# Patient Record
Sex: FEMALE | Race: WHITE | Employment: OTHER | URBAN - METROPOLITAN AREA
[De-identification: names, ages, dates, MRNs, and addresses within clinical notes are randomized per-mention and may not be internally consistent; named-entity substitution may affect disease eponyms.]

---

## 2020-10-09 ENCOUNTER — TELEPHONE (OUTPATIENT)
Dept: INTERNAL MEDICINE CLINIC | Age: 30
End: 2020-10-09

## 2020-10-09 NOTE — TELEPHONE ENCOUNTER
----- Message from Marlen Melendez sent at 10/9/2020  9:43 AM EDT -----  Regarding: Dr. Rosanna Fraire Message/Vendor Calls    Caller's first and last name: Milady Francoise      Reason for call:Np would like to have an appt to discuss Anxiety and Depression      Callback required yes/no and why: yes      Best contact number(s): [phone]      Details to clarify the request:       Marlen Melendez

## 2020-10-13 NOTE — TELEPHONE ENCOUNTER
Incoming call from a friend of the patient named Polly requesting a new patient with [facility]. Polly expressed she is concerned because the patient is having some mental health issues. Shonna Dumont is not taking anymore new patients for 2020 but will be happy to schedule with a provider who is taking new patients. Patient has been scheduled to see Justice Stevens on Oct [place]NJ at 8:30. Keyona Child will notify the patient of the appt details.

## 2020-10-21 NOTE — PROGRESS NOTES
Assessment and Plan   Diagnoses and all orders for this visit:    1. Anxiety  -     TSH 3RD GENERATION; Future  -     T4, FREE; Future  -     busPIRone (BUSPAR) 5 mg tablet; Take 1 Tab by mouth three (3) times daily. Indications: repeated episodes of anxiety  Has had anxiety and depression intermittently since teenage years and has previously been on medications including antidepressants, Klonopin, and lorazepam.  Has had a \"rough\" year with multiple medical issues with her animals that has caused some friction in her marriage. Feels that her anxiety symptoms are now affecting her daily life. DELANO-7 score of 18. She is hesitant to start SSRIs given side effects in the past.  Will do a trial of buspirone. Advised to call if she has any issues before next visit. 2. Insomnia, unspecified type  -     traZODone (DESYREL) 50 mg tablet; Take 1 Tab by mouth nightly as needed for Sleep. Has been using over-the-counter medications for sleep but that has not been helping. We will do a trial of trazodone    3. Routine adult health maintenance  -     HEMOGLOBIN A1C WITH EAG; Future  -     METABOLIC PANEL, COMPREHENSIVE; Future  -     LIPID PANEL; Future        Benefits, risks, possible drug interactions, and side effects of all new medications were reviewed with the patient. Pt verbalized understanding. Return to clinic: 4 to 6 weeks for medication follow-up    Adilene Maynard MD  [facility]  10/22/2020    Future Appointments   Date Time Provider Blaise Boyce   21/18/6207  3:00 AM Asiya Limon MD [facility]        History of Present Illness   Chief Complaint   Establish kody Sahni is a 31 y.o. female         Review of Systems   Constitutional: Negative for chills and fever. HENT: Negative for hearing loss. Eyes: Negative for blurred vision. Respiratory: Negative for shortness of breath. Cardiovascular: Negative for chest pain. Gastrointestinal: Negative for abdominal pain, blood in stool, constipation, diarrhea, melena, nausea and vomiting. Genitourinary: Negative for dysuria and hematuria. Musculoskeletal: Negative for joint pain. Skin: Negative for rash. Neurological: Negative for headaches. Past Medical History     Allergies   Allergen Reactions    Penicillins Anaphylaxis        Current Outpatient Medications   Medication Sig    levonorgestreL (Mirena) 20 mcg/24 hours (5 yrs) 52 mg IUD 1 Device by IntraUTERine route once.  traZODone (DESYREL) 50 mg tablet Take 1 Tab by mouth nightly as needed for Sleep.  busPIRone (BUSPAR) 5 mg tablet Take 1 Tab by mouth three (3) times daily. Indications: repeated episodes of anxiety     No current facility-administered medications for this visit. Patient Active Problem List   Diagnosis Code    Anxiety F41.9    Insomnia, unspecified type G47.00     Past Surgical History:   Procedure Laterality Date    HX APPENDECTOMY        Social History     Tobacco Use    Smoking status: Never Smoker    Smokeless tobacco: Current User    Tobacco comment: occl vape   Substance Use Topics    Alcohol use: Yes     Comment: ~2drinks/week      Family History   Problem Relation Age of Onset    No Known Problems Mother     No Known Problems Father     Heart Disease Maternal Grandfather     Stroke Maternal Grandmother         Physical Exam   Vitals:       Visit Vitals  /86   Pulse 67   Temp 98.4 °F (36.9 °C) (Oral)   Resp 16   Ht 5' 4\" (1.626 m)   Wt 163 lb 3.2 oz (74 kg)   SpO2 97%   BMI 28.01 kg/m²        Physical Exam  Constitutional:       General: She is not in acute distress. Appearance: She is well-developed. HENT:      Right Ear: Tympanic membrane, ear canal and external ear normal.      Left Ear: Tympanic membrane, ear canal and external ear normal.   Eyes:      Extraocular Movements: Extraocular movements intact.       Conjunctiva/sclera: Conjunctivae normal. Neck:      Musculoskeletal: Neck supple. Cardiovascular:      Rate and Rhythm: Normal rate and regular rhythm. Pulses: Normal pulses. Heart sounds: No murmur. No friction rub. No gallop. Pulmonary:      Effort: No respiratory distress. Breath sounds: No wheezing, rhonchi or rales. Abdominal:      General: Bowel sounds are normal. There is no distension. Palpations: Abdomen is soft. There is no hepatomegaly, splenomegaly or mass. Tenderness: There is no abdominal tenderness. There is no guarding. Skin:     General: Skin is warm. Findings: No rash. Neurological:      Mental Status: She is alert. Gait: Gait normal.   Psychiatric:         Mood and Affect: Mood normal.         Thought Content:  Thought content normal.         Judgment: Judgment normal.

## 2020-10-22 ENCOUNTER — OFFICE VISIT (OUTPATIENT)
Dept: INTERNAL MEDICINE CLINIC | Age: 30
End: 2020-10-22
Payer: COMMERCIAL

## 2020-10-22 VITALS
SYSTOLIC BLOOD PRESSURE: 136 MMHG | HEIGHT: 64 IN | HEART RATE: 67 BPM | TEMPERATURE: 98.4 F | OXYGEN SATURATION: 97 % | DIASTOLIC BLOOD PRESSURE: 86 MMHG | BODY MASS INDEX: 27.86 KG/M2 | RESPIRATION RATE: 16 BRPM | WEIGHT: 163.2 LBS

## 2020-10-22 DIAGNOSIS — F41.9 ANXIETY: Primary | ICD-10-CM

## 2020-10-22 DIAGNOSIS — G47.00 INSOMNIA, UNSPECIFIED TYPE: ICD-10-CM

## 2020-10-22 DIAGNOSIS — Z00.00 ROUTINE ADULT HEALTH MAINTENANCE: ICD-10-CM

## 2020-10-22 LAB
ALBUMIN SERPL-MCNC: 4 G/DL (ref 3.5–5)
ALBUMIN/GLOB SERPL: 1.5 {RATIO} (ref 1.1–2.2)
ALP SERPL-CCNC: 66 U/L (ref 45–117)
ALT SERPL-CCNC: 27 U/L (ref 12–78)
ANION GAP SERPL CALC-SCNC: 7 MMOL/L (ref 5–15)
AST SERPL-CCNC: 14 U/L (ref 15–37)
BILIRUB SERPL-MCNC: 0.8 MG/DL (ref 0.2–1)
BUN SERPL-MCNC: 13 MG/DL (ref 6–20)
BUN/CREAT SERPL: 15 (ref 12–20)
CALCIUM SERPL-MCNC: 9.4 MG/DL (ref 8.5–10.1)
CHLORIDE SERPL-SCNC: 107 MMOL/L (ref 97–108)
CHOLEST SERPL-MCNC: 167 MG/DL
CO2 SERPL-SCNC: 26 MMOL/L (ref 21–32)
CREAT SERPL-MCNC: 0.86 MG/DL (ref 0.55–1.02)
EST. AVERAGE GLUCOSE BLD GHB EST-MCNC: 94 MG/DL
GLOBULIN SER CALC-MCNC: 2.7 G/DL (ref 2–4)
GLUCOSE SERPL-MCNC: 84 MG/DL (ref 65–100)
HBA1C MFR BLD: 4.9 % (ref 4–5.6)
HDLC SERPL-MCNC: 59 MG/DL
HDLC SERPL: 2.8 {RATIO} (ref 0–5)
LDLC SERPL CALC-MCNC: 86.8 MG/DL (ref 0–100)
LIPID PROFILE,FLP: NORMAL
POTASSIUM SERPL-SCNC: 4.2 MMOL/L (ref 3.5–5.1)
PROT SERPL-MCNC: 6.7 G/DL (ref 6.4–8.2)
SODIUM SERPL-SCNC: 140 MMOL/L (ref 136–145)
T4 FREE SERPL-MCNC: 1 NG/DL (ref 0.8–1.5)
TRIGL SERPL-MCNC: 106 MG/DL (ref ?–150)
TSH SERPL DL<=0.05 MIU/L-ACNC: 0.92 UIU/ML (ref 0.36–3.74)
VLDLC SERPL CALC-MCNC: 21.2 MG/DL

## 2020-10-22 PROCEDURE — 99204 OFFICE O/P NEW MOD 45 MIN: CPT | Performed by: INTERNAL MEDICINE

## 2020-10-22 RX ORDER — TRAZODONE HYDROCHLORIDE 50 MG/1
50 TABLET ORAL
Qty: 30 TAB | Refills: 1 | Status: SHIPPED | OUTPATIENT
Start: 2020-10-22

## 2020-10-22 RX ORDER — LEVONORGESTREL 52 MG/1
1 INTRAUTERINE DEVICE INTRAUTERINE ONCE
COMMUNITY

## 2020-10-22 RX ORDER — BUSPIRONE HYDROCHLORIDE 5 MG/1
5 TABLET ORAL 3 TIMES DAILY
Qty: 90 TAB | Refills: 1 | Status: SHIPPED | OUTPATIENT
Start: 2020-10-22

## 2020-11-18 NOTE — PROGRESS NOTES
Consent: Meena Carrera, who was seen by synchronous (real-time) audio-video technology, and/or her healthcare decision maker, is aware that this patient-initiated, Telehealth encounter on 11/19/2020 is a billable service, with coverage as determined by her insurance carrier. She is aware that she may receive a bill and has provided verbal consent to proceed: Yes. I was in the office while conducting this encounter. This visit was done with doxy. me    Assessment and Plan   Diagnoses and all orders for this visit:    1. Anxiety  -     LORazepam (ATIVAN) 0.5 mg tablet; Take 1 tab by mouth once daily as needed up to twice a week for anxiety  2. Insomnia, unspecified type  -     suvorexant (BELSOMRA) 10 mg tablet; Take 1 Tab by mouth nightly as needed for Insomnia. Max Daily Amount: 10 mg. Indications: difficulty sleeping  From last visit:  \"Has had anxiety and depression intermittently since teenage years and has previously been on medications including antidepressants, Klonopin, and lorazepam.  Has had a \"rough\" year with multiple medical issues with her animals that has caused some friction in her marriage. Feels that her anxiety symptoms are now affecting her daily life. \"    Was also having difficulty with sleep maintenance. She was started on buspirone 5 mg 3 times a day and trazodone 50 mg at night for sleep. Patient reports that the buspirone was making her feel more irritable and felt like she was in a cloud and felt like her motor skills were off. Trazodone was also not helpful for sleep maintenance. Denies any sleep apnea or snoring. Patient is interested in going back on Ativan. Previously hesitant to start SSRIs due to side effects. We discussed the risk of long-term benzo use. Recommend using no more than twice a week. Recommend trial of Belsomra for sleep.   If no improvement in symptoms, consider other medications and possible psych referral.      We discussed the expected course, resolution and complications of the diagnosis(es) in detail. Medication risks, benefits, costs, interactions, and alternatives were discussed as indicated. I advised her to contact the office if her condition worsens, changes or fails to improve as anticipated. She expressed understanding with the diagnosis(es) and plan. Return to clinic: Next month for medication follow-up    Padmini Diaz MD  [facility]  11/19/2020    Future Appointments   Date Time Provider Blaise Boyce   37/41/4695  0:98 AM Juan Mansfield MD [facility]        Subjective   Chief Complaint   Medication follow-up    Gab Her is a 31 y.o. female         Review of Systems   Constitutional: Negative for chills and fever. Respiratory: Negative for shortness of breath. Cardiovascular: Negative for chest pain. Objective   Vitals:       Patient-Reported Vitals 11/19/2020   Patient-Reported Weight 163.0 per patient                 Physical Exam  Constitutional:       Appearance: Normal appearance. She is not ill-appearing. HENT:      Head: Normocephalic and atraumatic. Right Ear: External ear normal.      Left Ear: External ear normal.      Mouth/Throat:      Mouth: Mucous membranes are moist.   Eyes:      General:         Right eye: No discharge. Left eye: No discharge. Extraocular Movements: Extraocular movements intact. Conjunctiva/sclera: Conjunctivae normal.   Neck:      Musculoskeletal: Normal range of motion. Pulmonary:      Effort: Pulmonary effort is normal. No respiratory distress. Musculoskeletal:      Comments: Able to hold phone without issue   Skin:     Comments: No obvious facial rashes or abnormalities   Neurological:      Mental Status: She is alert and oriented to person, place, and time. Comments: No obvious focal deficit   Psychiatric:         Mood and Affect: Mood normal.         Thought Content:  Thought content normal.         Judgment: Judgment normal.          Current Outpatient Medications   Medication Sig    LORazepam (ATIVAN) 0.5 mg tablet Take 1 tab by mouth once daily as needed up to twice a week for anxiety    suvorexant (BELSOMRA) 10 mg tablet Take 1 Tab by mouth nightly as needed for Insomnia. Max Daily Amount: 10 mg. Indications: difficulty sleeping    levonorgestreL (Mirena) 20 mcg/24 hours (5 yrs) 52 mg IUD 1 Device by IntraUTERine route once.  traZODone (DESYREL) 50 mg tablet Take 1 Tab by mouth nightly as needed for Sleep.  busPIRone (BUSPAR) 5 mg tablet Take 1 Tab by mouth three (3) times daily. Indications: repeated episodes of anxiety     No current facility-administered medications for this visit. Nandini Porter is a 31 y.o. female being evaluated by a video visit encounter for concerns as above. A caregiver was present when appropriate. Due to this being a TeleHealth encounter (During HPNAE-38 public health emergency), evaluation of the following organ systems was limited: Vitals/Constitutional/EENT/Resp/CV/GI//MS/Neuro/Skin/Heme-Lymph-Imm. Pursuant to the emergency declaration under the [facility], 1135 waiver authority and the [employer] and [employer] Act, this Virtual  Visit was conducted, with patient's (and/or legal guardian's) consent, to reduce the patient's risk of exposure to COVID-19 and provide necessary medical care. Services were provided through a video synchronous discussion virtually to substitute for in-person clinic visit.

## 2020-11-19 ENCOUNTER — VIRTUAL VISIT (OUTPATIENT)
Dept: INTERNAL MEDICINE CLINIC | Age: 30
End: 2020-11-19
Payer: COMMERCIAL

## 2020-11-19 ENCOUNTER — TELEPHONE (OUTPATIENT)
Dept: INTERNAL MEDICINE CLINIC | Age: 30
End: 2020-11-19

## 2020-11-19 DIAGNOSIS — G47.00 INSOMNIA, UNSPECIFIED TYPE: ICD-10-CM

## 2020-11-19 DIAGNOSIS — F41.9 ANXIETY: Primary | ICD-10-CM

## 2020-11-19 PROCEDURE — 99213 OFFICE O/P EST LOW 20 MIN: CPT | Performed by: INTERNAL MEDICINE

## 2020-11-19 RX ORDER — LORAZEPAM 0.5 MG/1
TABLET ORAL
Qty: 10 TAB | Refills: 0 | Status: SHIPPED | OUTPATIENT
Start: 2020-11-19

## 2020-11-19 NOTE — TELEPHONE ENCOUNTER
Incoming fax from [employer] requesting an alternative medication for Belsomra 10mg. needed for insomnia. Pharmacy comments: alternative requested : Nonformulary. Please send in alternative medication.

## 2020-11-20 ENCOUNTER — TELEPHONE (OUTPATIENT)
Dept: INTERNAL MEDICINE CLINIC | Age: 30
End: 2020-11-20

## 2020-11-20 NOTE — TELEPHONE ENCOUNTER
[facility] response for medication Belsomra 10mg tablet for insomnia:   alternative requested : Non formulary

## 2020-11-23 ENCOUNTER — TELEPHONE (OUTPATIENT)
Dept: INTERNAL MEDICINE CLINIC | Age: 30
End: 2020-11-23

## 2020-11-23 NOTE — TELEPHONE ENCOUNTER
Called patient to inform patient that I received a response from [facility] stating that an alternative non formulary  medication will be needed for medication Belsomra  10 mg. I advised patient to call her insurance to get a list of alternative non formulary medication and send us a message through my chart and we can order the alternative med when we have a list. Patient verbalized understanding and was thankful for the call.

## 2020-11-24 NOTE — TELEPHONE ENCOUNTER
Please call the pt - doxepin is another alternative that might be covered. Usually pretty well tolerated, but can cause dry eyes/dry mouth and constipation. Does she want to try this if covered by insurance?

## 2020-11-24 NOTE — TELEPHONE ENCOUNTER
Attempted to contact patient several times via phone. No answer-- can not leave voicemail on patient phone. I called and left a message on spouse cell phone Indiranestor Saldaña and advised for Mrs Oscar Burnett to give me a call back. -- Awaiting call back.

## 2020-12-16 NOTE — PROGRESS NOTES
Consent: Navya Rodriguez, who was seen by synchronous (real-time) audio-video technology, and/or her healthcare decision maker, is aware that this patient-initiated, Telehealth encounter on 12/17/2020 is a billable service, with coverage as determined by her insurance carrier. She is aware that she may receive a bill and has provided verbal consent to proceed: Yes. I was in the office while conducting this encounter. This visit was done with doxy. me    Assessment and Plan   Diagnoses and all orders for this visit:    1. Anxiety  2. Insomnia, unspecified type  -     doxepin (SINEquan) 10 mg capsule; Take 1 Cap by mouth nightly. From last visit:  \"Has had anxiety and depression intermittently since teenage years and has previously been on medications including antidepressants, Klonopin, and lorazepam.  Has had a \"rough\" year with multiple medical issues with her animals that has caused some friction in her marriage. [place] Handy that her anxiety symptoms are now affecting her daily life.     Was also having difficulty with sleep maintenance. She was started on buspirone 5 mg 3 times a day and trazodone 50 mg at night for sleep. Patient reports that the buspirone was making her feel more irritable and felt like she was in a cloud and felt like her motor skills were off. Trazodone was also not helpful for sleep maintenance. Denies any sleep apnea or snoring. \"    Was started back on Ativan after last visit. Feels that the Ativan has not really been helping as much as it did in the past.  She was also unable to get Belsomra due to insurance issues. Recommend calling her previous doctor to see which dose of Ativan she was using. Also recommend a trial of doxepin for sleep. We discussed other options for anxiety including retrial of SSRIs or SNRIs. She is not interested due to side effects that she has had in the past.  Also discussed Viibryd.   Patient would like to establish care with a psychiatrist.  [facility] counseling.       We discussed the expected course, resolution and complications of the diagnosis(es) in detail. Medication risks, benefits, costs, interactions, and alternatives were discussed as indicated. I advised her to contact the office if her condition worsens, changes or fails to improve as anticipated. She expressed understanding with the diagnosis(es) and plan. Return to clinic: As needed    Renard Lomas MD  [facility]  12/17/2020    No future appointments. Subjective   Chief Complaint   Medication follow-up    Kirk Velarde is a 31 y.o. female         Review of Systems   Constitutional: Negative for chills and fever. Respiratory: Negative for shortness of breath. Cardiovascular: Negative for chest pain. Objective   Vitals:       Patient-Reported Vitals 12/17/2020   Patient-Reported Weight 163 lb per patient                 Physical Exam  Constitutional:       Appearance: Normal appearance. She is not ill-appearing. HENT:      Head: Normocephalic and atraumatic. Right Ear: External ear normal.      Left Ear: External ear normal.   Eyes:      General:         Right eye: No discharge. Left eye: No discharge. Extraocular Movements: Extraocular movements intact. Conjunctiva/sclera: Conjunctivae normal.   Neck:      Musculoskeletal: Normal range of motion. Pulmonary:      Effort: Pulmonary effort is normal. No respiratory distress. Musculoskeletal:      Comments: Able to hold phone without issue   Skin:     Comments: No obvious facial rashes or abnormalities   Neurological:      Mental Status: She is alert and oriented to person, place, and time. Comments: No obvious focal deficit   Psychiatric:         Mood and Affect: Mood normal.         Thought Content:  Thought content normal.         Judgment: Judgment normal.          Current Outpatient Medications   Medication Sig    doxepin (SINEquan) 10 mg capsule Take 1 Cap by mouth nightly.  LORazepam (ATIVAN) 0.5 mg tablet Take 1 tab by mouth once daily as needed up to twice a week for anxiety    suvorexant (BELSOMRA) 10 mg tablet Take 1 Tab by mouth nightly as needed for Insomnia. Max Daily Amount: 10 mg. Indications: difficulty sleeping    levonorgestreL (Mirena) 20 mcg/24 hours (5 yrs) 52 mg IUD 1 Device by IntraUTERine route once.  traZODone (DESYREL) 50 mg tablet Take 1 Tab by mouth nightly as needed for Sleep.  busPIRone (BUSPAR) 5 mg tablet Take 1 Tab by mouth three (3) times daily. Indications: repeated episodes of anxiety     No current facility-administered medications for this visit. Gab Her is a 31 y.o. female being evaluated by a video visit encounter for concerns as above. A caregiver was present when appropriate. Due to this being a TeleHealth encounter (During [address] emergency), evaluation of the following organ systems was limited: Vitals/Constitutional/EENT/Resp/CV/GI//MS/Neuro/Skin/Heme-Lymph-Imm. Pursuant to the emergency declaration under the [facility], 1135 waiver authority and the [employer] and [employer] Act, this Virtual  Visit was conducted, with patient's (and/or legal guardian's) consent, to reduce the patient's risk of exposure to COVID-19 and provide necessary medical care. Services were provided through a video synchronous discussion virtually to substitute for in-person clinic visit.

## 2020-12-17 ENCOUNTER — VIRTUAL VISIT (OUTPATIENT)
Dept: INTERNAL MEDICINE CLINIC | Age: 30
End: 2020-12-17
Payer: COMMERCIAL

## 2020-12-17 DIAGNOSIS — G47.00 INSOMNIA, UNSPECIFIED TYPE: ICD-10-CM

## 2020-12-17 DIAGNOSIS — F41.9 ANXIETY: Primary | ICD-10-CM

## 2020-12-17 PROCEDURE — 99213 OFFICE O/P EST LOW 20 MIN: CPT | Performed by: INTERNAL MEDICINE

## 2020-12-17 RX ORDER — DOXEPIN HYDROCHLORIDE 10 MG/1
10 CAPSULE ORAL
Qty: 30 CAP | Refills: 1 | Status: SHIPPED | OUTPATIENT
Start: 2020-12-17

## 2022-04-03 ENCOUNTER — APPOINTMENT (OUTPATIENT)
Dept: CT IMAGING | Age: 32
End: 2022-04-03
Attending: EMERGENCY MEDICINE
Payer: COMMERCIAL

## 2022-04-03 ENCOUNTER — APPOINTMENT (OUTPATIENT)
Dept: GENERAL RADIOLOGY | Age: 32
End: 2022-04-03
Attending: EMERGENCY MEDICINE
Payer: COMMERCIAL

## 2022-04-03 ENCOUNTER — HOSPITAL ENCOUNTER (OUTPATIENT)
Age: 32
Setting detail: OBSERVATION
Discharge: HOME | End: 2022-04-05
Attending: EMERGENCY MEDICINE | Admitting: INTERNAL MEDICINE
Payer: COMMERCIAL

## 2022-04-03 DIAGNOSIS — E87.0 HYPERNATREMIA: ICD-10-CM

## 2022-04-03 DIAGNOSIS — R41.82 ALTERED MENTAL STATUS, UNSPECIFIED ALTERED MENTAL STATUS TYPE: Primary | ICD-10-CM

## 2022-04-03 DIAGNOSIS — F10.929 ALCOHOLIC INTOXICATION WITH COMPLICATION (HCC): ICD-10-CM

## 2022-04-03 DIAGNOSIS — T50.902A INTENTIONAL OVERDOSE, INITIAL ENCOUNTER (HCC): ICD-10-CM

## 2022-04-03 LAB
ALBUMIN SERPL-MCNC: 4.2 G/DL (ref 3.5–5)
ALBUMIN/GLOB SERPL: 1.4 {RATIO} (ref 1.1–2.2)
ALP SERPL-CCNC: 64 U/L (ref 45–117)
ALT SERPL-CCNC: 27 U/L (ref 12–78)
AMYLASE SERPL-CCNC: 44 U/L (ref 25–115)
ANION GAP SERPL CALC-SCNC: 12 MMOL/L (ref 5–15)
APPEARANCE UR: CLEAR
AST SERPL-CCNC: 20 U/L (ref 15–37)
BACTERIA URNS QL MICRO: NEGATIVE /HPF
BASOPHILS # BLD: 0.1 K/UL (ref 0–0.1)
BASOPHILS NFR BLD: 1 % (ref 0–1)
BILIRUB SERPL-MCNC: 0.4 MG/DL (ref 0.2–1)
BILIRUB UR QL: NEGATIVE
BUN SERPL-MCNC: 8 MG/DL (ref 6–20)
BUN/CREAT SERPL: 8 (ref 12–20)
CALCIUM SERPL-MCNC: 8.6 MG/DL (ref 8.5–10.1)
CHLORIDE SERPL-SCNC: 110 MMOL/L (ref 97–108)
CO2 SERPL-SCNC: 24 MMOL/L (ref 21–32)
COLOR UR: NORMAL
COMMENT, HOLDF: NORMAL
CREAT SERPL-MCNC: 0.95 MG/DL (ref 0.55–1.02)
D DIMER PPP FEU-MCNC: 0.33 MG/L FEU (ref 0–0.65)
DIFFERENTIAL METHOD BLD: ABNORMAL
EOSINOPHIL # BLD: 0.1 K/UL (ref 0–0.4)
EOSINOPHIL NFR BLD: 1 % (ref 0–7)
EPITH CASTS URNS QL MICRO: NORMAL /LPF
ERYTHROCYTE [DISTWIDTH] IN BLOOD BY AUTOMATED COUNT: 11.9 % (ref 11.5–14.5)
GLOBULIN SER CALC-MCNC: 3 G/DL (ref 2–4)
GLUCOSE SERPL-MCNC: 98 MG/DL (ref 65–100)
GLUCOSE UR STRIP.AUTO-MCNC: NEGATIVE MG/DL
HCG UR QL: NEGATIVE
HCT VFR BLD AUTO: 43.1 % (ref 35–47)
HGB BLD-MCNC: 14.6 G/DL (ref 11.5–16)
HGB UR QL STRIP: NEGATIVE
IMM GRANULOCYTES # BLD AUTO: 0.1 K/UL (ref 0–0.04)
IMM GRANULOCYTES NFR BLD AUTO: 1 % (ref 0–0.5)
KETONES UR QL STRIP.AUTO: NEGATIVE MG/DL
LACTATE SERPL-SCNC: 1.9 MMOL/L (ref 0.4–2)
LEUKOCYTE ESTERASE UR QL STRIP.AUTO: NEGATIVE
LIPASE SERPL-CCNC: 104 U/L (ref 73–393)
LYMPHOCYTES # BLD: 3 K/UL (ref 0.8–3.5)
LYMPHOCYTES NFR BLD: 32 % (ref 12–49)
MAGNESIUM SERPL-MCNC: 2.5 MG/DL (ref 1.6–2.4)
MCH RBC QN AUTO: 30.3 PG (ref 26–34)
MCHC RBC AUTO-ENTMCNC: 33.9 G/DL (ref 30–36.5)
MCV RBC AUTO: 89.4 FL (ref 80–99)
MONOCYTES # BLD: 0.6 K/UL (ref 0–1)
MONOCYTES NFR BLD: 7 % (ref 5–13)
NEUTS SEG # BLD: 5.5 K/UL (ref 1.8–8)
NEUTS SEG NFR BLD: 59 % (ref 32–75)
NITRITE UR QL STRIP.AUTO: NEGATIVE
NRBC # BLD: 0 K/UL (ref 0–0.01)
NRBC BLD-RTO: 0 PER 100 WBC
PH UR STRIP: 6.5 [PH] (ref 5–8)
PHOSPHATE SERPL-MCNC: 3.9 MG/DL (ref 2.6–4.7)
PLATELET # BLD AUTO: 182 K/UL (ref 150–400)
PMV BLD AUTO: 11.8 FL (ref 8.9–12.9)
POTASSIUM SERPL-SCNC: 4.1 MMOL/L (ref 3.5–5.1)
PROT SERPL-MCNC: 7.2 G/DL (ref 6.4–8.2)
PROT UR STRIP-MCNC: NEGATIVE MG/DL
RBC # BLD AUTO: 4.82 M/UL (ref 3.8–5.2)
RBC #/AREA URNS HPF: NORMAL /HPF (ref 0–5)
SAMPLES BEING HELD,HOLD: NORMAL
SODIUM SERPL-SCNC: 146 MMOL/L (ref 136–145)
SP GR UR REFRACTOMETRY: <1.005 (ref 1–1.03)
TROPONIN-HIGH SENSITIVITY: 7 NG/L (ref 0–51)
UA: UC IF INDICATED,UAUC: NORMAL
UROBILINOGEN UR QL STRIP.AUTO: 0.2 EU/DL (ref 0.2–1)
WBC # BLD AUTO: 9.3 K/UL (ref 3.6–11)
WBC URNS QL MICRO: NORMAL /HPF (ref 0–4)

## 2022-04-03 PROCEDURE — 74011250636 HC RX REV CODE- 250/636: Performed by: EMERGENCY MEDICINE

## 2022-04-03 PROCEDURE — 85379 FIBRIN DEGRADATION QUANT: CPT

## 2022-04-03 PROCEDURE — 83735 ASSAY OF MAGNESIUM: CPT

## 2022-04-03 PROCEDURE — 96374 THER/PROPH/DIAG INJ IV PUSH: CPT

## 2022-04-03 PROCEDURE — 81001 URINALYSIS AUTO W/SCOPE: CPT

## 2022-04-03 PROCEDURE — 82140 ASSAY OF AMMONIA: CPT

## 2022-04-03 PROCEDURE — 80143 DRUG ASSAY ACETAMINOPHEN: CPT

## 2022-04-03 PROCEDURE — 80179 DRUG ASSAY SALICYLATE: CPT

## 2022-04-03 PROCEDURE — 84484 ASSAY OF TROPONIN QUANT: CPT

## 2022-04-03 PROCEDURE — 72125 CT NECK SPINE W/O DYE: CPT

## 2022-04-03 PROCEDURE — 81025 URINE PREGNANCY TEST: CPT

## 2022-04-03 PROCEDURE — 83690 ASSAY OF LIPASE: CPT

## 2022-04-03 PROCEDURE — 80307 DRUG TEST PRSMV CHEM ANLYZR: CPT

## 2022-04-03 PROCEDURE — 83605 ASSAY OF LACTIC ACID: CPT

## 2022-04-03 PROCEDURE — 70450 CT HEAD/BRAIN W/O DYE: CPT

## 2022-04-03 PROCEDURE — 93005 ELECTROCARDIOGRAM TRACING: CPT

## 2022-04-03 PROCEDURE — 71045 X-RAY EXAM CHEST 1 VIEW: CPT

## 2022-04-03 PROCEDURE — 80053 COMPREHEN METABOLIC PANEL: CPT

## 2022-04-03 PROCEDURE — 85025 COMPLETE CBC W/AUTO DIFF WBC: CPT

## 2022-04-03 PROCEDURE — 84100 ASSAY OF PHOSPHORUS: CPT

## 2022-04-03 PROCEDURE — 99285 EMERGENCY DEPT VISIT HI MDM: CPT

## 2022-04-03 PROCEDURE — 82150 ASSAY OF AMYLASE: CPT

## 2022-04-03 PROCEDURE — 82077 ASSAY SPEC XCP UR&BREATH IA: CPT

## 2022-04-03 PROCEDURE — 36415 COLL VENOUS BLD VENIPUNCTURE: CPT

## 2022-04-03 RX ORDER — NALOXONE HYDROCHLORIDE 1 MG/ML
1 INJECTION INTRAMUSCULAR; INTRAVENOUS; SUBCUTANEOUS
Status: COMPLETED | OUTPATIENT
Start: 2022-04-03 | End: 2022-04-03

## 2022-04-03 RX ADMIN — SODIUM CHLORIDE 1000 ML: 9 INJECTION, SOLUTION INTRAVENOUS at 23:58

## 2022-04-03 RX ADMIN — NALOXONE HYDROCHLORIDE 1 MG: 1 INJECTION PARENTERAL at 23:50

## 2022-04-04 PROBLEM — T14.91XA SUICIDE ATTEMPT (HCC): Status: ACTIVE | Noted: 2022-04-04

## 2022-04-04 PROBLEM — T50.901A DRUG OVERDOSE: Status: ACTIVE | Noted: 2022-04-04

## 2022-04-04 PROBLEM — F10.929 ALCOHOL INTOXICATION (HCC): Status: ACTIVE | Noted: 2022-04-04

## 2022-04-04 PROBLEM — G93.40 ACUTE ENCEPHALOPATHY: Status: ACTIVE | Noted: 2022-04-04

## 2022-04-04 LAB
AMMONIA PLAS-SCNC: 22 UMOL/L
AMPHET UR QL SCN: NEGATIVE
APAP SERPL-MCNC: <2 UG/ML (ref 10–30)
B PERT DNA SPEC QL NAA+PROBE: NOT DETECTED
BARBITURATES UR QL SCN: NEGATIVE
BENZODIAZ UR QL: NEGATIVE
BORDETELLA PARAPERTUSSIS PCR, BORPAR: NOT DETECTED
C PNEUM DNA SPEC QL NAA+PROBE: NOT DETECTED
CANNABINOIDS UR QL SCN: NEGATIVE
COCAINE UR QL SCN: NEGATIVE
DRUG SCRN COMMENT,DRGCM: NORMAL
ETHANOL SERPL-MCNC: 144 MG/DL
FLUAV H1 2009 PAND RNA SPEC QL NAA+PROBE: NOT DETECTED
FLUAV H1 RNA SPEC QL NAA+PROBE: NOT DETECTED
FLUAV H3 RNA SPEC QL NAA+PROBE: NOT DETECTED
FLUAV SUBTYP SPEC NAA+PROBE: NOT DETECTED
FLUBV RNA SPEC QL NAA+PROBE: NOT DETECTED
HADV DNA SPEC QL NAA+PROBE: NOT DETECTED
HCOV 229E RNA SPEC QL NAA+PROBE: NOT DETECTED
HCOV HKU1 RNA SPEC QL NAA+PROBE: NOT DETECTED
HCOV NL63 RNA SPEC QL NAA+PROBE: NOT DETECTED
HCOV OC43 RNA SPEC QL NAA+PROBE: NOT DETECTED
HMPV RNA SPEC QL NAA+PROBE: NOT DETECTED
HPIV1 RNA SPEC QL NAA+PROBE: NOT DETECTED
HPIV2 RNA SPEC QL NAA+PROBE: NOT DETECTED
HPIV3 RNA SPEC QL NAA+PROBE: NOT DETECTED
HPIV4 RNA SPEC QL NAA+PROBE: NOT DETECTED
M PNEUMO DNA SPEC QL NAA+PROBE: NOT DETECTED
METHADONE UR QL: NEGATIVE
OPIATES UR QL: NEGATIVE
PCP UR QL: NEGATIVE
RSV RNA SPEC QL NAA+PROBE: NOT DETECTED
RV+EV RNA SPEC QL NAA+PROBE: DETECTED
SALICYLATES SERPL-MCNC: <1.7 MG/DL (ref 2.8–20)
SARS-COV-2 PCR, COVPCR: NOT DETECTED

## 2022-04-04 PROCEDURE — 96375 TX/PRO/DX INJ NEW DRUG ADDON: CPT

## 2022-04-04 PROCEDURE — 0202U NFCT DS 22 TRGT SARS-COV-2: CPT

## 2022-04-04 PROCEDURE — 74011000250 HC RX REV CODE- 250: Performed by: EMERGENCY MEDICINE

## 2022-04-04 PROCEDURE — G0378 HOSPITAL OBSERVATION PER HR: HCPCS

## 2022-04-04 PROCEDURE — 96372 THER/PROPH/DIAG INJ SC/IM: CPT

## 2022-04-04 PROCEDURE — 74011250636 HC RX REV CODE- 250/636: Performed by: EMERGENCY MEDICINE

## 2022-04-04 PROCEDURE — 77010033678 HC OXYGEN DAILY

## 2022-04-04 PROCEDURE — 74011000250 HC RX REV CODE- 250: Performed by: INTERNAL MEDICINE

## 2022-04-04 PROCEDURE — 74011250636 HC RX REV CODE- 250/636: Performed by: INTERNAL MEDICINE

## 2022-04-04 RX ORDER — ENOXAPARIN SODIUM 100 MG/ML
40 INJECTION SUBCUTANEOUS DAILY
Status: DISCONTINUED | OUTPATIENT
Start: 2022-04-04 | End: 2022-04-05 | Stop reason: HOSPADM

## 2022-04-04 RX ORDER — SODIUM CHLORIDE 0.9 % (FLUSH) 0.9 %
5-40 SYRINGE (ML) INJECTION AS NEEDED
Status: DISCONTINUED | OUTPATIENT
Start: 2022-04-04 | End: 2022-04-05 | Stop reason: HOSPADM

## 2022-04-04 RX ORDER — ACETAMINOPHEN 650 MG/1
650 SUPPOSITORY RECTAL
Status: DISCONTINUED | OUTPATIENT
Start: 2022-04-04 | End: 2022-04-05 | Stop reason: HOSPADM

## 2022-04-04 RX ORDER — SODIUM CHLORIDE, SODIUM LACTATE, POTASSIUM CHLORIDE, CALCIUM CHLORIDE 600; 310; 30; 20 MG/100ML; MG/100ML; MG/100ML; MG/100ML
125 INJECTION, SOLUTION INTRAVENOUS CONTINUOUS
Status: DISCONTINUED | OUTPATIENT
Start: 2022-04-04 | End: 2022-04-05 | Stop reason: HOSPADM

## 2022-04-04 RX ORDER — ACETAMINOPHEN 325 MG/1
650 TABLET ORAL
Status: DISCONTINUED | OUTPATIENT
Start: 2022-04-04 | End: 2022-04-05 | Stop reason: HOSPADM

## 2022-04-04 RX ORDER — SODIUM CHLORIDE 0.9 % (FLUSH) 0.9 %
5-40 SYRINGE (ML) INJECTION EVERY 8 HOURS
Status: DISCONTINUED | OUTPATIENT
Start: 2022-04-04 | End: 2022-04-05 | Stop reason: HOSPADM

## 2022-04-04 RX ORDER — ONDANSETRON 2 MG/ML
4 INJECTION INTRAMUSCULAR; INTRAVENOUS
Status: DISCONTINUED | OUTPATIENT
Start: 2022-04-04 | End: 2022-04-05 | Stop reason: HOSPADM

## 2022-04-04 RX ORDER — ONDANSETRON 4 MG/1
4 TABLET, ORALLY DISINTEGRATING ORAL
Status: DISCONTINUED | OUTPATIENT
Start: 2022-04-04 | End: 2022-04-05 | Stop reason: HOSPADM

## 2022-04-04 RX ORDER — POLYETHYLENE GLYCOL 3350 17 G/17G
17 POWDER, FOR SOLUTION ORAL DAILY PRN
Status: DISCONTINUED | OUTPATIENT
Start: 2022-04-04 | End: 2022-04-05 | Stop reason: HOSPADM

## 2022-04-04 RX ADMIN — SODIUM CHLORIDE, PRESERVATIVE FREE 10 ML: 5 INJECTION INTRAVENOUS at 22:40

## 2022-04-04 RX ADMIN — THIAMINE HYDROCHLORIDE: 100 INJECTION, SOLUTION INTRAMUSCULAR; INTRAVENOUS at 01:13

## 2022-04-04 RX ADMIN — SODIUM CHLORIDE, PRESERVATIVE FREE 10 ML: 5 INJECTION INTRAVENOUS at 18:26

## 2022-04-04 RX ADMIN — ENOXAPARIN SODIUM 40 MG: 40 INJECTION SUBCUTANEOUS at 12:08

## 2022-04-04 NOTE — CONSULTS
Called at [address] to complete psych consult. Per RN Bob Dolan she will set up [employer] and call provider by [phone] when ready for consult.

## 2022-04-04 NOTE — ED TRIAGE NOTES
Pt arrives to ED in the back of SUV brought by \"ex \" who states that his wife is unconscious. Per the pt's sister that pt has been drinking alcohol and took an [place]is[place].   Pt withdraws to pain but is unresponsive to verbal.

## 2022-04-04 NOTE — ED PROVIDER NOTES
53-year-old female with a past medical history significant for anxiety and depression, status post appendectomy who presents to the ER accompanied by her ex- who state that the patient was found submerged in the bathtub with water at nose level unresponsive and unarousable. He states that approximately 2 hours ago he was talking to her on the phone about division of property and noted she was slurring her speech and had several lapses of confusion and disorientation. He attempted to call back and did not get an answer then decided to drive to the house with a friend. He also stated that he fell several glasses of bottle of alcohol and Tupperware container that may have unknown medication that the patient may have consumed. He is concerned that she may have taken several tablets of  Ambien. He denies any previous history of overdose and suicide attempt. He stated that they have been  for a year but not legally  and are living in 2 separate households. The patient is unable to give any history at this time. No past medical history on file.     Past Surgical History:   Procedure Laterality Date    HX APPENDECTOMY           Family History:   Problem Relation Age of Onset    No Known Problems Mother     No Known Problems Father     Heart Disease Maternal Grandfather     Stroke Maternal Grandmother        Social History     Socioeconomic History    Marital status:      Spouse name: Not on file    Number of children: Not on file    Years of education: Not on file    Highest education level: Not on file   Occupational History    Not on file   Tobacco Use    Smoking status: Never Smoker    Smokeless tobacco: Current User    Tobacco comment: occl vape   Substance and Sexual Activity    Alcohol use: Yes     Comment: ~2drinks/week    Drug use: Never    Sexual activity: Yes     Partners: Male   Other Topics Concern    Not on file   Social History Narrative    Not on file     Social Determinants of Health     Financial Resource Strain:     Difficulty of Paying Living Expenses: Not on file   Food Insecurity:     Worried About Running Out of Food in the Last Year: Not on file    Mabel of Food in the Last Year: Not on file   Transportation Needs:     Lack of Transportation (Medical): Not on file    Lack of Transportation (Non-Medical): Not on file   Physical Activity:     Days of Exercise per Week: Not on file    Minutes of Exercise per Session: Not on file   Stress:     Feeling of Stress : Not on file   Social Connections:     Frequency of Communication with Friends and Family: Not on file    Frequency of Social Gatherings with Friends and Family: Not on file    Attends Confucianism Services: Not on file    Active Member of [address] [place] [address] [employer] or Organizations: Not on file    Attends Club or Organization Meetings: Not on file    Marital Status: Not on file   Intimate Partner Violence:     Fear of Current or Ex-Partner: Not on file    Emotionally Abused: Not on file    Physically Abused: Not on file    Sexually Abused: Not on file   Housing Stability:     Unable to Pay for Housing in the Last Year: Not on file    Number of [place] in the Last Year: Not on file    Unstable Housing in the Last Year: Not on file         ALLERGIES: Penicillins    Review of Systems   Unable to perform ROS: Acuity of condition       Vitals:    04/03/22 2303   BP: 123/80   Pulse: 85   Resp: 17   SpO2: 100%            Physical Exam  Vitals and nursing note reviewed. Exam conducted with a chaperone present. CONSTITUTIONAL: Well-appearing; well-nourished; in no apparent distress;   HEAD: Normocephalic; atraumatic  EYES: PERRL; EOM intact; conjunctiva and sclera are clear bilaterally. ENT: No rhinorrhea; normal pharynx with no tonsillar hypertrophy; mucous membranes pink/moist, no erythema, no exudate.   NECK: Supple; non-tender; no cervical lymphadenopathy  CARD: Normal S1, S2; no murmurs, rubs, or gallops. Regular rate and rhythm. RESP: Normal respiratory effort; breath sounds clear and equal bilaterally; no wheezes, rhonchi, or rales. ABD: Normal bowel sounds; non-distended; non-tender; no palpable organomegaly, no masses, no bruits. Back Exam: Normal inspection; no vertebral point tenderness, no CVA tenderness. EXT: Non-tender to palpation; no swelling or deformity; distal pulses are normal, no edema. SKIN: Warm; dry; no rash. NEURO: Lethargic but arousable to painful stimuli, GCS=8;  incoherent, sensory and motor are non-focal.    MDM  Number of Diagnoses or Management Options  Altered mental status, unspecified altered mental status type  Diagnosis management comments: Assessment: 81-year-old female who presents to the ER for evaluation for altered mental status with suspicion of alcohol and polysubstance abuse in a suicide attempt. There is a strong smell of alcohol emanating from the patient's breath. The patient is currently unresponsive and unable to communicate. Most of the history was obtained from  and family at the bedside. Need evaluation for ACS, VTE, CVA, Takins  Alcohol and substance abuse. Plan: Lab/EKG/chest x-ray/IV fluid/CT scan of the head and cervical spine/Narcan/education, reassurance, symptomatic treatment/ Monitor and Reevaluate.          Amount and/or Complexity of Data Reviewed  Clinical lab tests: ordered and reviewed  Tests in the radiology section of CPT®: ordered and reviewed  Tests in the medicine section of CPT®: reviewed and ordered  Discussion of test results with the performing providers: yes  Decide to obtain previous medical records or to obtain history from someone other than the patient: yes  Obtain history from someone other than the patient: yes  Review and summarize past medical records: yes  Discuss the patient with other providers: yes  Independent visualization of images, tracings, or specimens: yes    Risk of Complications, Morbidity, and/or Mortality  Presenting problems: moderate  Diagnostic procedures: moderate  Management options: moderate  General comments: Total critical care time spent exclusive of procedures:  60 minutes    Patient Progress  Patient progress: stable         Procedures    ED EKG interpretation:  Rhythm: normal sinus rhythm; and regular . Rate (approx.): 84; Axis: normal; P wave: normal; QRS interval: normal ; ST/T wave: normal; in  Lead: Diffusely; Other findings: borderline ekg. This EKG was interpreted by Lisa Hunt MD,ED Provider. XRAY INTERPRETATION (ED MD)  Chest Xray  No acute process seen. Normal heart size. No bony abnormalities. No infiltrate. Rock Mark MD 11:24 PM    PROGRESS NOTE:  Pt has been reexamined by Rock Mark MD all available results have been reviewed with pt and any available family. They understands sx, dx, and tx in ED. Poison control was notified and they recommended supportive care. care plan has been outlined and questions have been answered. Pt and any available family understands and agrees to need for admission to hospital for further tx not available in ED. Pt is ready for admission. Written by Lisa Hunt MD,  12:48 AM    .   Ellie Andre MD spoke with Dr. Alvina Martinez Discussed patient's presentation, history, physical assessment, and available diagnostic results. Will come and see the patient in the ED. hyponatremia      Perfect Serve Consult for Admission  12:50 AM    ED Room Number: WT05/TR05  Patient Name and age:  Padmini Barton 36 y.o.  female  Working Diagnosis:   1. Altered mental status, unspecified altered mental status type    2. Alcoholic intoxication with complication ([facility]    3. Intentional overdose, initial encounter ([facility]    4.  Hypernatremia        COVID-19 Suspicion:  no  Sepsis present:  no  Reassessment needed: no  Code Status:  Full Code  Readmission: no  Isolation Requirements:  no  Recommended Level of Care: telemetry  Department:[facility] - [phone]  Other: 37-year-old female who presents to the ER for further evaluation after responsive with intentional overdose of alcohol benzodiazepine. The patient is still unresponsive but hemodynamically stable at this time. Supportive care has been recommended by poison control.     Total critical care time spent exclusive of procedures:  60 minutes

## 2022-04-04 NOTE — ED NOTES
TRANSFER - OUT REPORT:    Verbal report given to Abdiaziz Villeda RN(name) on Carole Borjas  being transferred to 4th floor(unit) for routine progression of care       Report consisted of patients Situation, Background, Assessment and   Recommendations(SBAR). Information from the following report(s) SBAR, ED Summary, [facility] and Cardiac Rhythm NSR was reviewed with the receiving nurse. Lines:   Peripheral IV 04/03/22 Left Antecubital (Active)   Site Assessment Clean, dry, & intact 04/03/22 2305   Dressing Status Clean, dry, & intact 04/03/22 2305        Opportunity for questions and clarification was provided.       Patient transported with:   Tech, transport

## 2022-04-04 NOTE — PROGRESS NOTES
4/4/2022. Saw patient during rounds, awake alert and oriented, no apparent distress, pleasant and cooperative with physical examination. Patient is pending evaluation by psychiatry. Plan of care as outlined in H&P by admitting physician.

## 2022-04-04 NOTE — ED NOTES
Report given to Lul Ludwig and Roverto Mercado, RNs, for continuity of care. Chief Complaint: EtOH, Ambien, and other unlabeled unknown medication/drug ingestion last night (random pills in [address] container found next to patient), found unresponsive by ex- in bathtub. Maintained own airway throughout entirety of evening into morning. No history of SI/HI, unable to determine intent at this time. Medical History: Insomnia, anxiety, depression, appendectomy. Code Status: Full Code    Isolation: None    Diet: NPO    Medications Scheduled: LR @ 125/hour, lovenox in AM    IV Access: 20G LT AC    Monitor/Vital Signs: sinus rhythm on monitor at 87 BPM, /71, 96% on 2LNC    Oxygen: 2LNC    Mobility: Normally ambulatory and independent, now activity as tolerated. Has not ambulated in ED. Interventions: Labs, imaging, transfer for inpatient treatment, cardiac/respiratory monitoring, EKG    Significant Results: EtOH 144 (4/3 at 2313)    Outstanding Orders: AM medication, Activity as tolerated    Consults: IP Psychiatry    Plan of Care: Admit for AMS, EtOH intoxication, overdose of unknown intent. Family at Bedside: Not at this time, but sister stepped out to check on farm.

## 2022-04-04 NOTE — PROGRESS NOTES
1856: [facility] computer placed in room 427 for virtual visit with Justina Del Rio NP ( Mental Health/ psych) At  19:45 pm . Will report to oncoming nurse.

## 2022-04-04 NOTE — PROGRESS NOTES
04/04/22 10:43 AM  Psych consult called to [address] Assigned provider will be Rachid Baptiste NP.   LAWSON Ocasio

## 2022-04-04 NOTE — ED NOTES
Patient called RN to bedside, awake, speaking in full, clear sentences, requesting to use the restroom. Offered patient bedside commode as a first step, to which patient was agreeable. Patient able to stand at bedside with assistance and use bedside commode. Returned to stretcher. Will trial on room air to evaluate SATs. Sister remains at bedside, attentive to patient.

## 2022-04-04 NOTE — PROGRESS NOTES
4/4/2022 11:46 AM  Care Management Assessment      Reason for Admission: Emergency -       ICD-10-CM ICD-9-CM    1. Altered mental status, unspecified altered mental status type  R41.82 780.97    2. Alcoholic intoxication with complication ([facility]  Z24.149 305.00    3. Intentional overdose, initial encounter ([facility]  T50.902A 977.9      E950.5    4. Hypernatremia  E87.0 276.0        Assessment:   [x]In person with pt, pt's spouse whom she is  from and pt's mother. RUR: n/a under Obs   Risk Level: [x]Low []Moderate []High  Value-based purchasing: [] Yes [x] No  Bundle patient: [] Yes [x] No   Specify:     Advance Directive: Full Code. [x] No AD on file. [] AD on file. [] Current AD not on file. Copy requested. [] Requests AD, and referral submitted to [facility].      Healthcare Decision Maker:         Assessment:    Age: 36    Sex: [] Male [x]Female     Residency: [x]Private residence     Lives With: -With roommate     Prior functioning:  [x]Independent with ADLs and iADLS []Dependent with ADLs and iADLs []Partial dependence, Specify:     Prior DME required:  [x]None []RW []Cane []Crutches []Bedside commode []CPAP []Home O2 (Liter/Provider: ) []Nebulizer   []Shower Chair []Wheelchair []Hospital Bed [][place] []Stair lift []Rollator []Other:    DME available: [x]None []RW []Cane []Crutches []Bedside commode []CPAP []Home O2 (Liter/Provider: ) []Nebulizer   []Shower Chair []Wheelchair []Hospital Bed [][place] []Stair lift []Rollator []Other:    Rehab history: [x]None []Outpatient PT []Home Health (Provider/Date: ) []SNF (Provider/Date: ) []IPR (Provider/Date: ) []LTC (Provider/Date: ) []Hospice (Provider/Date: )  []Other:     Insurer:   Insurance Information                [employer] [address] [place] [address] Phone: --    SubscriberAelisabet Ordoñez Subscriber#: [id number]    Group#: [id number] Precert#: --          PCP: Jabari Reyes Pt now only visits her OBGYN for needs    Address: [address] [facility] [address] [place] [address]   Phone number: [phone]   Current patient: []Yes []No   Approximate date of last visit:   Access to virtual PCP visits: [x]Yes []No    Pharmacy: [facility] [address] Transport: TBD       Transition of care plan:    [x]Unable to determine at this time. Awaiting clinical progress, and disposition recommendations. Following for psych eval to determine discharge disposition.   TELLO Mason    Care Management Interventions  MyChart Signup: No  Discharge Durable Medical Equipment: No  Physical Therapy Consult: No  Occupational Therapy Consult: No  Speech Therapy Consult: No  Support Systems: Spouse/Significant Other,Parent(s)  Discharge Location  Patient Expects to be Discharged to[de-identified] Unable to determine at this time

## 2022-04-04 NOTE — ED TRIAGE NOTES
Patient received from [facility] ED. Report received from [place], [facility], prior to arrival. Patient remains lethargic, but is moving all extremities independently at this time. Maintaining her own airway without difficulty, speaking in full, albeit slurred, sentences. Patient awaiting inpatient bed assignment. Evaluated by ED provider Dr. Braeden Guerrero.

## 2022-04-04 NOTE — ED NOTES
Spoke with Lili from [facility]. Test results reported. Recommendation received for symptomatic and supportive care. Admit if necessary.

## 2022-04-04 NOTE — ED NOTES
Patient's sister arrived, at bedside, attentive to patient. Updated sister on patient's improving condition and plan of care. Sister verbalized her understanding. Patient awake, oriented, states she is sleepy, but speaking softly to sister in full sentences.

## 2022-04-04 NOTE — PROGRESS NOTES
Problem: Patient Education: Go to Patient Education Activity  Goal: Patient/Family Education  Outcome: Progressing Towards Goal     Problem: Suicide  Goal: *STG: Remains safe in hospital  Outcome: Progressing Towards Goal  Goal: *STG: Seeks staff when feelings of self harm or harm towards others arise  Outcome: Progressing Towards Goal  Goal: *STG: Attends activities and groups  Outcome: Progressing Towards Goal  Goal: *STG:  Verbalizes alternative ways of dealing with maladaptive feelings/behaviors  Outcome: Progressing Towards Goal  Goal: *STG/LTG: Complies with medication therapy  Outcome: Progressing Towards Goal  Goal: *STG/LTG: No longer expresses self destructive or suicidal thoughts  Outcome: Progressing Towards Goal  Goal: *LTG:  Identifies available community resources  Outcome: Progressing Towards Goal  Goal: *LTG:  Develops proactive suicide prevention plan  Outcome: Progressing Towards Goal  Goal: Interventions  Outcome: Progressing Towards Goal     Problem: Patient Education: Go to Patient Education Activity  Goal: Patient/Family Education  Outcome: Progressing Towards Goal

## 2022-04-04 NOTE — H&P
[address]  [facility]  [phone]    Admission History and Physical      NAME:  Sheryl Torres   :   1990   MRN:  [id number]     PCP:  Gemma Glover MD     Date of service:  2022         Subjective:     CHIEF COMPLAINT: Unresponsiveness    HISTORY OF PRESENT ILLNESS:     Ms. Tom Corona is a 36 y.o.  female who is admitted with drug overdose. Ms. Tom Corona with past medical history was anxiety and depression brought to ER due to unresponsiveness. Patient unable to provide history and patient history is obtained from medical records and ER physician. According to the report, her ex- talked to her on the phone about 2 hours prior to coming to the ER patient was slurring her speech and confused. She tried to call back, but did not get any answer. Later, he went to her house and he found her in a bathtub with water at a dose level unresponsive. He noted several bottles of alcohol and and labeled medication container. There is a concern that she might take Ambien needs alcohol, but not how much. No previous history of overdose or suicide attempt. They have been  for about a year. Per the pt's sister that pt has been drinking alcohol and took an [place] yesterday night. No past medical history on file.      Past Surgical History:   Procedure Laterality Date    HX APPENDECTOMY         Social History     Tobacco Use    Smoking status: Never Smoker    Smokeless tobacco: Current User    Tobacco comment: occl vape   Substance Use Topics    Alcohol use: Yes     Comment: ~2drinks/week        Family History   Problem Relation Age of Onset    No Known Problems Mother     No Known Problems Father     Heart Disease Maternal Grandfather     Stroke Maternal Grandmother         Allergies   Allergen Reactions    Penicillins Anaphylaxis        Prior to Admission medications    Medication Sig Start Date End Date Taking? Authorizing Provider   doxepin (SINEquan) 10 mg capsule Take 1 Cap by mouth nightly. 18/90/47   Alfredo Lee MD   LORazepam (ATIVAN) 0.5 mg tablet Take 1 tab by mouth once daily as needed up to twice a week for anxiety 57/30/74   Alfredo Lee MD   suvorexant (BELSOMRA) 10 mg tablet Take 1 Tab by mouth nightly as needed for Insomnia. Max Daily Amount: 10 mg. Indications: difficulty sleeping 58/86/21   Alfredo Lee MD   levonorgestreL (Mirena) 20 mcg/24 hours (5 yrs) 52 mg IUD 1 Device by IntraUTERine route once. Provider, Feliciano   traZODone (DESYREL) 50 mg tablet Take 1 Tab by mouth nightly as needed for Sleep. 14/99/39   Alfredo Lee MD   busPIRone (BUSPAR) 5 mg tablet Take 1 Tab by mouth three (3) times daily.  Indications: repeated episodes of anxiety 78/67/75   Alfredo Lee MD         Review of Systems:  Unable to provide history       Objective:      VITALS:    Vital signs reviewed; most recent are:    Visit Vitals  /61   Pulse 82   Temp 98.1 °F (36.7 °C)   Resp 22   SpO2 97%     SpO2 Readings from Last 6 Encounters:   04/04/22 97%   10/22/20 97%    O2 Flow Rate (L/min): 2 l/min       Intake/Output Summary (Last 24 hours) at 4/4/2022 0217  Last data filed at 4/4/2022 0058  Gross per 24 hour   Intake 1000 ml   Output    Net 1000 ml            Exam:     Physical Exam:    Gen:  Well-developed, well-nourished, in no acute distress  HEENT:  Pink conjunctivae, PERRL, hearing intact to voice, moist mucous membranes  Neck:  Supple, without masses, thyroid non-tender  Resp:  No accessory muscle use, clear breath sounds without wheezes rales or rhonchi  Card:  No murmurs, normal S1, S2 without thrills, bruits or peripheral edema  Abd:  Soft, non-tender, non-distended, normoactive bowel sounds are present, no palpable organomegaly and no detectable hernias  Lymph:  No cervical or inguinal adenopathy  Musc:  No cyanosis or clubbing  Skin:  No rashes or ulcers, skin turgor is good  Neuro: Does not follow commands  Psych: Unable to provide history       Labs:    Recent Labs     04/03/22  2313   WBC 9.3   HGB 14.6   HCT 43.1        Recent Labs     04/03/22  2313   *   K 4.1   *   CO2 24   GLU 98   BUN 8   CREA 0.95   CA 8.6   MG 2.5*   PHOS 3.9   ALB 4.2   TBILI 0.4   ALT 27     No results found for: GLUCPOC  No results for input(s): PH, PCO2, PO2, HCO3, FIO2 in the last 72 hours. No results for input(s): INR, INREXT in the last 72 hours. Telemetry reviewed:   normal sinus rhythm       Assessment/Plan:    1. Drug overdose (4/4/2022). Per report, patient took unknown number of Ambien and was drinking alcohol(unlabeled medication container was also found at home). Admit to telemetry and monitor clinically. 2.  Suicide attempt ([facility] (4/4/2022). Suicide precaution. Consult psychiatry    3. Alcohol intoxication ([address] (4/4/2022). Her blood alcohol level is 144. Continue goody bag.     4.  Acute encephalopathy (4/4/2022) due to alcohol intoxication. Patient is more responsive during my evaluation. Monitor    5. Anxiety and depression (10/22/2020).   Psychiatry evaluation when more awake         Previous medical records reviewed     Risk of deterioration: high      Total time spent with patient: 79 [address] [place] discussed with: Patient, Nursing Staff and >50% of time spent in counseling and coordination of care    Discussed:  Care Plan    Prophylaxis:  Lovenox    Probable Disposition:  Home w/Family           ___________________________________________________    Attending Physician: Neo Mcfadden MD

## 2022-04-05 VITALS
TEMPERATURE: 97.9 F | RESPIRATION RATE: 16 BRPM | BODY MASS INDEX: 28.01 KG/M2 | HEART RATE: 61 BPM | DIASTOLIC BLOOD PRESSURE: 72 MMHG | SYSTOLIC BLOOD PRESSURE: 114 MMHG | HEIGHT: 64 IN | OXYGEN SATURATION: 98 %

## 2022-04-05 LAB
ALBUMIN SERPL-MCNC: 3.2 G/DL (ref 3.5–5)
ALBUMIN/GLOB SERPL: 1.2 {RATIO} (ref 1.1–2.2)
ALP SERPL-CCNC: 57 U/L (ref 45–117)
ALT SERPL-CCNC: 23 U/L (ref 12–78)
ANION GAP SERPL CALC-SCNC: 4 MMOL/L (ref 5–15)
AST SERPL-CCNC: 11 U/L (ref 15–37)
ATRIAL RATE: 84 BPM
BILIRUB SERPL-MCNC: 0.4 MG/DL (ref 0.2–1)
BUN SERPL-MCNC: 11 MG/DL (ref 6–20)
BUN/CREAT SERPL: 15 (ref 12–20)
CALCIUM SERPL-MCNC: 8.4 MG/DL (ref 8.5–10.1)
CALCULATED P AXIS, ECG09: 68 DEGREES
CALCULATED R AXIS, ECG10: 78 DEGREES
CALCULATED T AXIS, ECG11: 54 DEGREES
CHLORIDE SERPL-SCNC: 112 MMOL/L (ref 97–108)
CO2 SERPL-SCNC: 25 MMOL/L (ref 21–32)
CREAT SERPL-MCNC: 0.75 MG/DL (ref 0.55–1.02)
DIAGNOSIS, 93000: NORMAL
ERYTHROCYTE [DISTWIDTH] IN BLOOD BY AUTOMATED COUNT: 11.9 % (ref 11.5–14.5)
GLOBULIN SER CALC-MCNC: 2.6 G/DL (ref 2–4)
GLUCOSE SERPL-MCNC: 99 MG/DL (ref 65–100)
HCT VFR BLD AUTO: 38.5 % (ref 35–47)
HGB BLD-MCNC: 13.3 G/DL (ref 11.5–16)
MCH RBC QN AUTO: 30.6 PG (ref 26–34)
MCHC RBC AUTO-ENTMCNC: 34.5 G/DL (ref 30–36.5)
MCV RBC AUTO: 88.5 FL (ref 80–99)
NRBC # BLD: 0 K/UL (ref 0–0.01)
NRBC BLD-RTO: 0 PER 100 WBC
P-R INTERVAL, ECG05: 158 MS
PLATELET # BLD AUTO: 167 K/UL (ref 150–400)
PMV BLD AUTO: 11.8 FL (ref 8.9–12.9)
POTASSIUM SERPL-SCNC: 3.8 MMOL/L (ref 3.5–5.1)
PROT SERPL-MCNC: 5.8 G/DL (ref 6.4–8.2)
Q-T INTERVAL, ECG07: 382 MS
QRS DURATION, ECG06: 90 MS
QTC CALCULATION (BEZET), ECG08: 451 MS
RBC # BLD AUTO: 4.35 M/UL (ref 3.8–5.2)
SODIUM SERPL-SCNC: 141 MMOL/L (ref 136–145)
VENTRICULAR RATE, ECG03: 84 BPM
WBC # BLD AUTO: 7.2 K/UL (ref 3.6–11)

## 2022-04-05 PROCEDURE — 80053 COMPREHEN METABOLIC PANEL: CPT

## 2022-04-05 PROCEDURE — G0378 HOSPITAL OBSERVATION PER HR: HCPCS

## 2022-04-05 PROCEDURE — 36415 COLL VENOUS BLD VENIPUNCTURE: CPT

## 2022-04-05 PROCEDURE — 74011250637 HC RX REV CODE- 250/637: Performed by: INTERNAL MEDICINE

## 2022-04-05 PROCEDURE — 85027 COMPLETE CBC AUTOMATED: CPT

## 2022-04-05 PROCEDURE — 74011000250 HC RX REV CODE- 250: Performed by: INTERNAL MEDICINE

## 2022-04-05 RX ADMIN — SODIUM CHLORIDE, PRESERVATIVE FREE 10 ML: 5 INJECTION INTRAVENOUS at 06:22

## 2022-04-05 RX ADMIN — ACETAMINOPHEN 650 MG: 325 TABLET ORAL at 09:36

## 2022-04-05 NOTE — CONSULTS
Psychiatric Consultation      DATE OF EVALUATION:  4/4/2022    ATTENDING PHYSICIAN:  Yelitza Watters MD    REASON FOR REFERRAL:    Psychiatric consultation requested for Marcia Holland to evaluate patient for suicide attempt. HISTORY OF PRESENT ILLNESS:  The patient, Marcia Holland, is a 36 y.o.  female who presents on an inpatient medical unit after found unresponsive by her  and friend following use of ETOH and Ambien. Patient states unintentional overdose. Stating she has been having insomnia and has prescribed Ambien. Reports she was with friends earlier drinking wine at a winery and did not consider issue at that time with taking the Ambien. Reports she was not trying is take her life. Denies depression. Denies SI or plan. Denies A/VH. States she has been under a great deal of stress trying to keep up with 4 different jobs and sleep has been poor. Denies rene. States ETOH use is only occasional glass or wine and yesterday's event was out of the ordinary. States she is currently in therapy for anxiety and taking Buspar. PAST MEDICAL HISTORY  Patient Active Problem List    Diagnosis Date Noted    Drug overdose 04/04/2022    Alcohol intoxication ([facility] [address] 04/04/2022    Suicide attempt ([facility] [address] 04/04/2022    Acute encephalopathy 04/04/2022    Anxiety 10/22/2020    Insomnia, unspecified type 10/22/2020     No past medical history on file. MEDICATION PRIOR TO ADMISSION:  Prior to Admission medications    Medication Sig Start Date End Date Taking? Authorizing Provider   doxepin (SINEquan) 10 mg capsule Take 1 Cap by mouth nightly. 83/62/40   Zhen Quinones MD   LORazepam (ATIVAN) 0.5 mg tablet Take 1 tab by mouth once daily as needed up to twice a week for anxiety 02/91/99   Zhen Quinones MD   suvorexant (BELSOMRA) 10 mg tablet Take 1 Tab by mouth nightly as needed for Insomnia. Max Daily Amount: 10 mg.  Indications: difficulty sleeping 07/72/56   Zhen Quinones MD levonorgestreL (Mirena) 20 mcg/24 hours (5 yrs) 52 mg IUD 1 Device by IntraUTERine route once. Provider, Historical   traZODone (DESYREL) 50 mg tablet Take 1 Tab by mouth nightly as needed for Sleep. 39/61/06   Johnnie Shin MD   busPIRone (BUSPAR) 5 mg tablet Take 1 Tab by mouth three (3) times daily. Indications: repeated episodes of anxiety 29/52/66   Johnnie Shin MD       ALLERGIES:  Allergies   Allergen Reactions    Penicillins Anaphylaxis    Psychosocial History:  DELANO  ADHD  Lives with her roommate   from   Works 4 jobs  Bachelor's Degree    SOCIAL HISTORY:  Social History     Tobacco Use    Smoking status: Never Smoker    Smokeless tobacco: Current User    Tobacco comment: occl vape   Substance Use Topics    Alcohol use: Yes     Comment: ~2drinks/week       FAMILY HISTORY:  Family History   Problem Relation Age of Onset    No Known Problems Mother     No Known Problems Father     Heart Disease Maternal Grandfather     Stroke Maternal Grandmother         REVIEW OF SYSTEMS:  The patient denies complaints of rene, depression,, delusions,   A/V hallucinations, suicidal ideation, homicidal ideation. Endorses anxiety    Medical review of systems mainly considered negative.         MENTAL STATUS EXAM:  Sensorium  oriented to time, place and person   Orientation person, place, time/date, situation, day of week, month of year and year   Relations cooperative   Eye Contact appropriate   Appearance:  age appropriate and within normal Limits   Motor Behavior:  within normal limits   Speech:  normal pitch and normal volume   Vocabulary average   Thought Process: goal directed and logical   Thought Content free of delusions and free of hallucinations   Suicidal ideations no plan  and no intention   Homicidal ideations no plan  and no intention   Mood:  depressed   Affect:  depressed   Memory recent  adequate   Memory remote:  adequate   Concentration:  adequate Abstraction:  abstract   Insight:  fair   Reliability fair   Judgment:  fair     ASSESSMENT:  The patient is a 36 y.o.  female with a history of general anxiety and insomnia. Recommendation:  Patient currently does not meet criteria for inpatient behavioral treatment. Patient to continue outpatient with therapist following discharge. Discontinue 1:1 SI precautions  Consider Trazodone 50 mg for sleep    We will follow patient's course along with you as necessary. Thank you for the opportunity to participate in the care of your patient.                     SIGNED:    Kym Barton NP  4/4/2022

## 2022-04-05 NOTE — DISCHARGE INSTRUCTIONS
Patient Education        9 Ways to Cut Back on Drinking  Maybe you've found yourself drinking more alcohol than you'd prefer. If you want to cut back, here are some ideas to try. Think before you drink. Do you really want a drink, or is it just a habit? If you're used to having a drink at a certain time, try doing something else then. Look for substitutes. Find some no-alcohol drinks that you enjoy, like flavored seltzer water, tea with honey, or tonic with a slice of lime. Or try alcohol-free beer or \"virgin\" cocktails (without the alcohol). Drink more water. Use water to quench your thirst. Drink a glass of water before you have any alcohol. Have another glass along with every drink or between drinks. Shrink your drink. For example, have a bottle of beer instead of a pint. Use a smaller glass for wine. Choose drinks with lower alcohol content (ABV%). Or use less liquor and more mixer in cocktails. Slow down. It's easy to drink quickly and without thinking about it. Pay attention, and make each drink last longer. Do the math. Total up how much you spend on alcohol each month. How much is that a year? If you cut back, what could you do with the money you save? Take a break. Choose a day or two each week when you won't drink at all. Notice how you feel on those days, physically and emotionally. How did you sleep? Do you feel better? Over time, add more break days. Count calories. Would you like to lose some weight? That can be a good motivator for cutting back. Figure out how many calories are in each drink. How many does that add up to in a day? In a week? In a month? Practice saying no. Be ready when someone offers you a drink. Try: Misa Rees, I've had enough. \" Or \"Thanks, but I'm cutting back. \" Or \"No, thanks. I feel better when I drink less. \"   Current as of: November 8, 2021               Content Version: 13.2  © 5123-3660 Healthwise, [facility].    Care instructions adapted under license by its learning (which disclaims liability or warranty for this information). If you have questions about a medical condition or this instruction, always ask your healthcare professional. [address] any warranty or liability for your use of this information. Patient Education        Alcohol, Drug, or Poison Ingestion: Care Instructions  Overview     A person can become very sick, or die, from swallowing or using alcohol, drugs, or poisons. Alcohol poisoning occurs when a person drinks a large amount of alcohol. Alcohol can stop nerve signals that control breathing. It can also stop the gag reflex that prevents choking. Alcohol poisoning is serious. It can lead to brain damage or death if it's not treated right away. Drugs can be used by accident or on purpose. They can be swallowed, inhaled, injected, or absorbed through the skin. Drugs include over-the-counter medicine (such as aspirin or acetaminophen) and prescription medicine. They also include vitamins and supplements. And they include illegal drugs such as cocaine and heroin. And poisons are all around us. They include household , cosmetics, houseplants, and garden chemicals. The doctor has checked you carefully, but problems can develop later. If you notice any problems or new symptoms, get medical treatment right away. Follow-up care is a key part of your treatment and safety. Be sure to make and go to all appointments, and call your doctor if you are having problems. It's also a good idea to know your test results and keep a list of the medicines you take. How can you care for yourself at home? Alcohol problems  · Talk to your doctor or counselor about programs that can help you stop using alcohol. · Plan ways to avoid being tempted to drink. ? Get rid of all alcohol in your home. ? Avoid places where you tend to drink.   ? Stay away from places or events that offer alcohol. ? Stay away from people who drink a lot. Drug problems  · Talk to your doctor about programs that can help you stop using drugs. · Get rid of any drugs you might be tempted to misuse. · Learn how to say no when other people use drugs. · Don't spend time with people who use drugs. Poison prevention  · Keep products in the containers they came in. Keep them with the original labels. · Be careful when you use cleaning products, paints, solvents, and pesticides. Read labels before use. Use a fan to move strong odors and fumes out of your home. · Do not mix cleaning products. Try to use nontoxic . These include vinegar, lemon juice, and baking soda. When should you call for help? Poison control centers, hospitals, or your doctor can give immediate advice in the case of a poisoning. The [facility] [employer] number is [phone]. Have the poison container with you so you can give complete information to the poison control center, such as what the poison or substance is, how much was taken and when. Do not try to make the person vomit. Call [employer], the national suicide hotline at 1-800-273-talk (7-985.248.4467), or other emergency services immediately anytime you think you may need emergency care. For example, call if you or someone else:    · Is thinking seriously of suicide or has recently tried suicide.     · Has used or currently uses alcohol or drugs and is very confused or can't stay awake.     · Has passed out (lost consciousness).     · Has severe trouble breathing.     · Is having a seizure. Call your doctor now or seek immediate medical care if you or someone else:    · Has new symptoms, or is not acting normally.     · If the overdose or ingestion was related to a suicide attempt. Watch closely for changes in your health, and be sure to contact your doctor if:    · You do not get better as expected.     · You need help with drug or alcohol problems.   · You have problems with depression or other mental health issues. Where can you learn more? Go to http://www.gray.com/  Enter A385 in the search box to learn more about \"Alcohol, Drug, or Poison Ingestion: Care Instructions. \"  Current as of: October 6, 2021               Content Version: 13.2  © 2006-2022 [employer]. Care instructions adapted under license by [employer] (which disclaims liability or warranty for this information). If you have questions about a medical condition or this instruction, always ask your healthcare professional. [place] [address] any warranty or liability for your use of this information. Patient Education        Alcohol Detoxification and Withdrawal: Care Instructions  Your Care Instructions     If you drink alcohol regularly and then suddenly stop, you may go through some physical and emotional problems while the alcohol clears out of your system. Clearing the alcohol from your body is called detoxification, or detox. Physical and emotional problems that may happen during detox are called withdrawal.  Symptoms of withdrawal can be scary and dangerous. Mild symptoms include nausea and vomiting, sweating, shakiness, and intense worry. Severe symptoms include being confused and irritable, feeling things on your body that are not there, seeing or hearing things that are not there, and trembling. You may even have seizures. If your symptoms become severe you must see a doctor. People who drink large amounts of alcohol should not try to detox at home. A person can die of severe alcohol withdrawal.  Symptoms of alcohol withdrawal may begin from 4 to 12 hours after you stop drinking. But they may not start for several days after the last drink. They can last a few days. It is hard to stop drinking.  But when you have cleared the alcohol from your system, you will be able to start the next part of your life, free from the burden of being dependent. Follow-up care is a key part of your treatment and safety. Be sure to make and go to all appointments, and call your doctor if you are having problems. It's also a good idea to know your test results and keep a list of the medicines you take. How can you care for yourself at home? · Before you stop drinking, talk to your doctor about how you plan to stop. Be sure to be completely honest with him or her about how much you have been drinking. Your doctor will figure out whether you need to detox in a supervised medical center. · Take your medicines exactly as prescribed. Call your doctor if you think you are having a problem with your medicine. · Make sure someone you trust is with you the whole time. Have friends and family members take turns staying with you until you are done with detox. · Put a list of emergency numbers near the phone. This should include your doctor, the police, the nearest hospital and emergency room, and neighbors who can help if needed. · Make sure all alcohol is removed from the house before you start. This includes beverages as well as medicines, rubbing alcohol, and certain flavorings like vanilla extract. · Keep \"drinking buddies\" away during the time you are going through detox. · Make your surroundings calm. Soft lights, soft music, and a comfortable place to sit or lie down can help make the process easier. · Drink lots of fluids and eat snacks such as fruit, cheese and crackers, and pretzels. Foods high in carbohydrate may help reduce the craving for alcohol. · Understand that detox is going to be hard. · Keep in mind that the people watching over you during detox are there to help. Explain to them before you start that you may not act like yourself until detox is finished. · Consider joining a support group such as Alcoholics Anonymous.  Sharing your experiences with other people who face similar challenges may help you feel less overwhelmed. · Keep the numbers for these national suicide hotlines: 1-059-919-TALK (3-963.768.1957) and 9-341-GRXHIZZ (5-770.475.7381). If you or someone you know talks about suicide or feeling hopeless, get help right away. When should you call for help? Call 911 anytime you think you may need emergency care. For example, call if:    · You feel you cannot stop from hurting yourself or someone else.     · You vomit many times and cannot stop.     · You vomit blood or what looks like coffee grounds.     · You have trouble breathing or are breathing very fast.     · Your heart beats more than 120 times a minute and will not slow down.     · You have chest pain.     · You have a seizure.     · You see or feel things that are not there (hallucinate). If you are caring for someone who is going through detox, call if:    · The person passes out (loses consciousness).     · The person sees or feels things that are not there and sees or hears the same things many times.     · The person is very agitated and will not calm down.     · The person becomes violent or threatens to be violent.     · The person has a seizure. Call your doctor now or seek immediate medical care if:    · You have a high fever.     · You have severe belly pain.     · You are very shaky. Watch closely for changes in your health, and be sure to contact your doctor if:    · You do not get better as expected. Where can you learn more? Go to http://www.Telller.com/  Enter D051 in the search box to learn more about \"Alcohol Detoxification and Withdrawal: Care Instructions. \"  Current as of: November 8, 2021               Content Version: 13.2  © 1225-5724 [employer]. Care instructions adapted under license by [employer] (which disclaims liability or warranty for this information).  If you have questions about a medical condition or this instruction, always ask your healthcare professional. [employer] disclaims any warranty or liability for your use of this information. Patient Education     Altered Mental Status: Care Instructions  Your Care Instructions  Altered mental status is a change in how well your brain is working. As a result, you may be confused, be less alert than usual, or act in odd ways. This may include seeing or hearing things that aren't really there (hallucinations). A mental status change has many possible causes. For example, it may be the result of an infection, an imbalance of chemicals in the body, or a chronic disease such as diabetes or COPD. It can also be caused by things such as a head injury, taking certain medicines, or using alcohol or drugs. The doctor may do tests to look for the cause. These tests may include urine tests, blood tests, and imaging tests such as a CT scan. Sometimes a clear cause isn't found. But tests can help the doctor rule out a serious cause of your symptoms. A change in mental status can be scary. But mental status will often return to normal when the cause is treated. So it is important to get any follow-up testing or treatment the doctor has suggested. The doctor has checked you carefully, but problems can develop later. If you notice any problems or new symptoms, get medical treatment right away. Follow-up care is a key part of your treatment and safety. Be sure to make and go to all appointments, and call your doctor if you are having problems. It's also a good idea to know your test results and keep a list of the medicines you take. How can you care for yourself at home? · Be safe with medicines. Take your medicines exactly as prescribed. Call your doctor if you think you are having a problem with your medicine. · Have another adult stay with you until you are better. This can help keep you safe. Ask that person to watch for signs that your mental status is getting worse. When should you call for help?   Call [phone] anytime you think you may need emergency care. For example, call if:  · You passed out (lost consciousness). Call your doctor now or seek immediate medical care if:  · Your mental status is getting worse. · You have new symptoms, such as a fever, chills, or shortness of breath. · You do not feel safe. Watch closely for changes in your health, and be sure to contact your doctor if:  · You do not get better as expected. Where can you learn more? Go to Smarter[employer]be  Enter J452 in the search box to learn more about \"Altered Mental Status: Care Instructions. \"   © 5187-0147 Healthwise, [employer]. Care instructions adapted under license by [employer] (which disclaims liability or warranty for this information). This care instruction is for use with your licensed healthcare professional. If you have questions about a medical condition or this instruction, always ask your healthcare professional. [place] [address] any warranty or liability for your use of this information. Content Version: 68.5.529198; Current as of: November 20, 2015           Patient Education        Alcohol, Drug, or Poison Ingestion in Children: Care Instructions  Overview     A child can become very sick, or die, from swallowing alcohol, drugs, or poisons. Alcohol is in beer, wine, and spirits. But it also is in mouthwash and food extracts. A child can become ill after swallowing only a little bit. Drugs include over-the-counter medicine (such as aspirin or acetaminophen) and prescription medicine. They also include vitamins and supplements. And they include illegal drugs, such as cocaine and heroin. And poisons are all around us. They include household , cosmetics, houseplants, and garden chemicals. The best way to protect your child is to make sure that all alcohol, medicine, and household products are kept out of sight.  This is a good time to check around your house to make sure that your child can't get to them. The doctor has checked your child carefully, but problems can develop later. If you notice any problems or new symptoms, get medical treatment right away. Follow-up care is a key part of your child's treatment and safety. Be sure to make and go to all appointments, and call your doctor if your child is having problems. It's also a good idea to know your child's test results and keep a list of the medicines your child takes. How can you care for your child at home? · Follow your doctor's instructions about closely watching your child's health and behavior. Prevention  · Keep all alcohol, drugs, and poisons out of sight. For example:  ? Do not take your medicines in front of your child because they may try to do what you do.  ? Never leave alcohol, medicines, or household products out when you are not in the room. ? Nancy Juarez may have medicines with them. Make sure that guests keep their bags out of the reach of your child. ? Do not keep products like oven  and  soap under the kitchen sink. ? Keep products in the containers they came in. Keep the original labels on them. ? Remove poisonous plants from your home. When should you call for help? If you see your child swallow poison or you think that your child has swallowed some, stay calm. Call the [address] [place] [address] at [phone]. Have the product, alcohol, or medicine container with you. Use it to tell the  exactly what your child took. The poison control center can tell you what to do right away. Do not make your child vomit unless you are told to. Call 911 anytime you think your child may need emergency care. For example, call if:    · Your child passes out (loses consciousness).     · Your child is confused or is very sleepy.     · Your child has severe trouble breathing.     · Your child has a seizure.    Call your doctor now or seek immediate medical care if:    · Your child has new symptoms or is not acting normally. Watch closely for changes in your child's health, and be sure to contact your doctor if:    · Your child does not get better as expected. Where can you learn more? Go to http://www.gray.com/  Enter F939 in the search box to learn more about \"Alcohol, Drug, or Poison Ingestion in Children: Care Instructions. \"  Current as of: October 6, 2021               Content Version: 13.2  © 2006-2022 [employer]. Care instructions adapted under license by [employer] (which disclaims liability or warranty for this information). If you have questions about a medical condition or this instruction, always ask your healthcare professional. [place] [address] any warranty or liability for your use of this information. Patient Education        Acute Alcohol Intoxication: Care Instructions  Your Care Instructions     You have had treatment to help your body rid itself of alcohol. Too much alcohol upsets the body's fluid balance. Your doctor may have given you fluids and vitamins. For some people, drinking too much alcohol is a one-time event. For others, it is an ongoing problem. In either case, it is serious. It can be life-threatening. Follow-up care is a key part of your treatment and safety. Be sure to make and go to all appointments, and call your doctor if you are having problems. It's also a good idea to know your test results and keep a list of the medicines you take. How can you care for yourself at home? · Do not drink and drive. · Be safe with medicines. Take your medicines exactly as prescribed. Call your doctor if you think you are having a problem with your medicine. · Your doctor may have prescribed disulfiram (Antabuse). Do not drink any alcohol while you are taking this medicine. You may have severe or even life-threatening side effects from even small amounts of alcohol.   · If you were given medicine to prevent nausea, be sure to take it exactly as prescribed. · Before you take any medicine, tell your doctor if:  ? You have had a bad reaction to any medicines in the past.  ? You are taking other medicines, including over-the-counter ones, or have other health problems. ? You are or could be pregnant. · Be prepared to have some symptoms of withdrawal in the next few days. · Drink plenty of liquids in the next few days. · Seek help if you need it to stop drinking. Getting counseling and joining a support group can help you stay sober. Try a support group such as Alcoholics Anonymous. · Avoid alcohol when you take medicines. It can react with many medicines and cause serious problems. When should you call for help? Call 911 anytime you think you may need emergency care. For example, call if:    · You feel confused and are seeing things that are not there.     · You are thinking about killing yourself or hurting others.     · You have a seizure.     · You vomit blood or what looks like coffee grounds. Call your doctor now or seek immediate medical care if:    · You have trembling, restlessness, sweating, and other withdrawal symptoms that are new or that get worse.     · Your withdrawal symptoms come back after not bothering you for days or weeks.     · You can't stop vomiting. Watch closely for changes in your health, and be sure to contact your doctor if:    · You need help to stop drinking. Where can you learn more? Go to http://www.gray.com/  Enter T102 in the search box to learn more about \"Acute Alcohol Intoxication: Care Instructions. \"  Current as of: November 8, 2021               Content Version: 13.2  © 7741-7844 [employer]. Care instructions adapted under license by [employer] (which disclaims liability or warranty for this information).  If you have questions about a medical condition or this instruction, always ask your healthcare professional. [address] any warranty or liability for your use of this information. Patient Education        Anxiety Disorder: Care Instructions  Your Care Instructions     Anxiety is a normal reaction to stress. Difficult situations can cause you to have symptoms such as sweaty palms and a nervous feeling. In an anxiety disorder, the symptoms are far more severe. Constant worry, muscle tension, trouble sleeping, nausea and diarrhea, and other symptoms can make normal daily activities difficult or impossible. These symptoms may occur for no reason, and they can affect your work, school, or social life. Medicines, counseling, and self-care can all help. Follow-up care is a key part of your treatment and safety. Be sure to make and go to all appointments, and call your doctor if you are having problems. It's also a good idea to know your test results and keep a list of the medicines you take. How can you care for yourself at home? · Take medicines exactly as directed. Call your doctor if you think you are having a problem with your medicine. · Go to your counseling sessions and follow-up appointments. · Recognize and accept your anxiety. Then, when you are in a situation that makes you anxious, say to yourself, \"This is not an emergency. I feel uncomfortable, but I am not in danger. I can keep going even if I feel anxious. \"  · Be kind to your body:  ? Relieve tension with exercise or a massage. ? Get enough rest.  ? Avoid alcohol, caffeine, nicotine, and illegal drugs. They can increase your anxiety level and cause sleep problems. ? Learn and do relaxation techniques. See below for more about these techniques. · Engage your mind. Get out and do something you enjoy. Go to a funny movie, or take a walk or hike. Plan your day. Having too much or too little to do can make you anxious. · Keep a record of your symptoms.  Discuss your fears with a good friend or family member, or join a support group for people with similar problems. Talking to others sometimes relieves stress. · Get involved in social groups, or volunteer to help others. Being alone sometimes makes things seem worse than they are. · Get at least 30 minutes of exercise on most days of the week to relieve stress. Walking is a good choice. You also may want to do other activities, such as running, swimming, cycling, or playing tennis or team sports. Relaxation techniques  Do relaxation exercises 10 to 20 minutes a day. You can play soothing, relaxing music while you do them, if you wish. · Tell others in your house that you are going to do your relaxation exercises. Ask them not to disturb you. · Find a comfortable place, away from all distractions and noise. · Lie down on your back, or sit with your back straight. · Focus on your breathing. Make it slow and steady. · Breathe in through your nose. Breathe out through either your nose or mouth. · Breathe deeply, filling up the area between your navel and your rib cage. Breathe so that your belly goes up and down. · Do not hold your breath. · Breathe like this for 5 to 10 minutes. Notice the feeling of calmness throughout your whole body. As you continue to breathe slowly and deeply, relax by doing the following for another 5 to 10 minutes:  · Tighten and relax each muscle group in your body. You can begin at your toes and work your way up to your head. · Imagine your muscle groups relaxing and becoming heavy. · Empty your mind of all thoughts. · Let yourself relax more and more deeply. · Become aware of the state of calmness that surrounds you. · When your relaxation time is over, you can bring yourself back to alertness by moving your fingers and toes and then your hands and feet and then stretching and moving your entire body. Sometimes people fall asleep during relaxation, but they usually wake up shortly afterward.   · Always give yourself time to return to full alertness before you drive a car or do anything that might cause an accident if you are not fully alert. Never play a relaxation tape while you drive a car. When should you call for help? Call 911 anytime you think you may need emergency care. For example, call if:    · You feel you cannot stop from hurting yourself or someone else. Keep the numbers for these national suicide hotlines: 8-355-387-TALK (2-611.342.9638) and 9-945-DPWQBFU (4-436.763.6150). If you or someone you know talks about suicide or feeling hopeless, get help right away. Watch closely for changes in your health, and be sure to contact your doctor if:    · You have anxiety or fear that affects your life.     · You have symptoms of anxiety that are new or different from those you had before. Where can you learn more? Go to http://www.mclaughlin.com/  Enter P754 in the search box to learn more about \"Anxiety Disorder: Care Instructions. \"  Current as of: June 16, 2021               Content Version: 13.2  © 1707-9660 [employer]. Care instructions adapted under license by [employer] (which disclaims liability or warranty for this information). If you have questions about a medical condition or this instruction, always ask your healthcare professional. [place] [address] any warranty or liability for your use of this information. Patient Education        Generalized Anxiety Disorder in Teens: Care Instructions  Overview     We all worry. It's a normal part of life. But when you have generalized anxiety disorder, you worry about lots of things. You have a hard time not worrying. This worry or anxiety interferes with your relationships, work or school, and other areas of your life. You may worry most days about things like money, health, work, or friends. That may make you feel tired, tense, or cranky. It can make it hard to think.  It may get in the way of healthy sleep. Counseling and medicine can both work to treat anxiety. They are often used together with lifestyle changes, such as getting enough sleep. Treatment can include a type of counseling called cognitive-behavioral therapy, or CBT. It helps you notice and replace thoughts that make you worry. You also might have counseling along with those closest to you so that they can help. Follow-up care is a key part of your treatment and safety. Be sure to make and go to all appointments, and call your doctor if you are having problems. It's also a good idea to know your test results and keep a list of the medicines you take. How can you care for yourself at home? · Get at least 30 minutes of exercise on most days of the week. Walking is a good choice. You also may want to do other activities, such as running, swimming, cycling, or playing tennis or team sports. · Learn and do relaxation exercises, such as deep breathing. · Go to bed at the same time every night. Try for 8 to 10 hours of sleep a night. · Avoid alcohol, marijuana, and illegal drugs. · Find a counselor who uses cognitive-behavioral therapy (CBT). · Don't isolate yourself. Let those closest to you help you. Find someone you can trust and confide in. Talk to that person. · Be safe with medicines. Take your medicines exactly as prescribed. Call your doctor if you think you are having a problem with your medicine. · Practice healthy thinking. How you think can affect how you feel and act. Ask yourself if your thoughts are helpful or unhelpful. If they are unhelpful, you can learn how to change them. · Recognize and accept your anxiety. When you feel anxious, say to yourself, \"This is not an emergency. I feel uncomfortable, but I am not in danger. I can keep going even if I feel anxious. \"  When should you call for help? Call 911  anytime you think you may need emergency care.  For example, call if:    · You feel you can't stop from hurting yourself or someone else. Keep the numbers for these national suicide hotlines: 3-491-791-TALK (4-125.401.1252) and 9-265-EPACYWF (6-167.775.5427). If you or someone you know talks about suicide or feeling hopeless, get help right away. Call your doctor or counselor now or seek immediate medical care if:    · You have new anxiety, or your anxiety gets worse.     · You have been feeling sad, depressed, or hopeless or have lost interest in things that you usually enjoy.     · You do not get better as expected. Where can you learn more? Go to http://www.gray.com/  Enter G105 in the search box to learn more about \"Generalized Anxiety Disorder in Teens: Care Instructions. \"  Current as of: June 16, 2021               Content Version: 13.2  © 3285-3760 [employer]. Care instructions adapted under license by [employer] (which disclaims liability or warranty for this information). If you have questions about a medical condition or this instruction, always ask your healthcare professional. [place] [address] any warranty or liability for your use of this information. Patient Education        Learning About Generalized Anxiety Disorder  What is generalized anxiety disorder? We all worry. It's a normal part of life. But when you have generalized anxiety disorder, you worry about lots of things and have a hard time stopping your worry. This worry or anxiety interferes with your relationships, work, and life. What causes it? The cause of generalized anxiety disorder is not known. Some studies show that it might be passed down through families. Several things can cause symptoms of anxiety. They include some health problems, some medicines, too much caffeine, and illegal drugs such as cocaine. What are the symptoms? Generalized anxiety disorder can make you feel worried and stressed about many things almost every day.  You may have a hard time controlling your worry. Symptoms include:  · Feeling tired or cranky. You may have a hard time concentrating. · Having headaches or muscle aches. · Feeling shaky, sweating, or having hot flashes. · Feeling lightheaded, sick to your stomach, or out of breath. · Feeling like you can't relax. Or being startled easily. · Having problems falling or staying asleep. How is it diagnosed? Your doctor will ask about your health and how often you worry or feel anxious. People with generalized anxiety disorder have more worry and stress than normal. They feel worried and stressed about many things almost every day. And these feelings have lasted for at least 6 months. Your doctor also may ask about other symptoms, like whether you:  · Feel restless. · Feel tired. · Have a hard time thinking or feel that your mind goes blank. · Feel cranky. · Have tense muscles. · Have sleep problems. A physical exam and tests can help make sure that your symptoms aren't caused by a different condition, such as a thyroid problem. How is it treated? Counseling and medicine can both work to treat anxiety. The two are often used along with lifestyle changes. Cognitive-behavioral therapy (CBT) is a type of counseling that's used to help treat anxiety. In CBT, you learn how to notice and replace thoughts that make you feel worried. It also can help you learn how to relax when you worry. Applied relaxation therapy may also be used. Your counselor might ask you to imagine a calming situation. This can help you relax. Medicines can help. These medicines are often also used for depression. Selective serotonin reuptake inhibitors (SSRIs) are often tried first. But there are other medicines that your doctor may use. You may need to try a few medicines to find one that works well. Many people feel better by getting regular exercise, eating healthy meals, and getting good sleep.  Mindfulness--focusing on things in the present moment--also can help reduce your anxiety. What can you expect when you have it? Having anxiety can be upsetting. Some people might feel less worried and stressed after a couple of months of treatment. But for other people, it might take longer to feel better. Reaching out to people for help is important. Try not to isolate yourself. Let your family and friends help you. Find someone you can trust and confide in. Talk to that person. When you know what anxiety is--and how you can get help for it--you can start to learn new ways of thinking. This can help you cope and work through your anxiety. Follow-up care is a key part of your treatment and safety. Be sure to make and go to all appointments, and call your doctor if you are having problems. It's also a good idea to know your test results and keep a list of the medicines you take. Where can you learn more? Go to http://www.mclaughlin.com/  Enter G110 in the search box to learn more about \"Learning About Generalized Anxiety Disorder. \"  Current as of: June 16, 2021               Content Version: 13.2  © 0262-3931 [employer]. Care instructions adapted under license by [employer] (which disclaims liability or warranty for this information). If you have questions about a medical condition or this instruction, always ask your healthcare professional. [place] [address] any warranty or liability for your use of this information. Patient Education        Learning About Substance Use Screening in Children and Teens  What is substance use screening? Children and teens often experiment with lots of things, including alcohol, drugs, and tobacco.  Your child's doctor will ask your child questions to get a better idea of any substances your child may have tried. This is called screening. The answers help the doctor know if there are signs of a problem.   If you don't think that your child or teen has been screened for substance use, you can ask the doctor to do a screening test.  Why is it important? Finding signs of substance use at an early age is important. That's because early substance use may:  · Increase the risk that your child keeps using substances and has a substance use disorder later on. · Affect your child's growth and development, memory, and learning. · Make car crashes more likely. · Lead to risky behaviors like having sex without a condom. This can lead to pregnancy and sexually transmitted infections. · Lead to depression and suicide. · Make it hard for your child to find his or her identity, build relationships, and do well in school. When is this screening done? Substance use screenings usually start around the time of puberty. But they can be done earlier. Your child may have this screening anytime he or she visits a doctor. The U.S. Preventive Services Task Force (USPSTF) has not recommended for or against routine screening and counseling to prevent or reduce alcohol use disorder in teens. The American Academy of Pediatrics recommends that all adolescents should be screened for alcohol, tobacco, and drug use at every visit. If you don't think that your child or teen has been screened for substance use, you can ask the doctor to do a screening test.  How is it done? The doctor will ask questions about your child's thoughts toward substance use. The doctor will also ask about what substances your child may have tried, what effect those substances have had, and how often your child has used them. The doctor will ask your child questions such as:  · Have you used alcohol, marijuana, or vapes (e-cigarettes)? · Have you used prescription stimulants or pain medicines? · Have you sniffed, huffed, or inhaled things like glues, spray paint, or whipped cream?  Starting in the preteen years, most doctors spend part of the visit talking to your child alone.  This helps your child start to take charge of his or her own health. It also gives your child a chance to talk about things that can be hard to talk about in front of parents. State laws differ about what your child can and can't choose to keep private. Your doctor can explain what those things are. The doctor may also ask questions to screen for other conditions like ADHD, depression, and anxiety. These conditions can make a child more likely to use substances. Your child's doctor may want to treat them. What happens after the screening? What happens next depends on what the screening shows. If the screening doesn't show any substance use, the doctor will encourage the healthy choices your child is making. If the screening raises concerns, the doctor may ask more questions. The doctor may also lead a discussion that helps your child weigh the pros and cons of substance use. And the doctor may make a plan to help your child stop using drugs or alcohol. If the screening shows signs of substance use disorder or a health problem related to substance use, the doctor may discuss treatment options. Where can you learn more? Go to http://www.gray.com/  Enter S475 in the search box to learn more about \"Learning About Substance Use Screening in Children and Teens. \"  Current as of: November 8, 2021               Content Version: 13.2  © 2006-2022 Healthwise[employer]. Care instructions adapted under license by [employer] (which disclaims liability or warranty for this information). If you have questions about a medical condition or this instruction, always ask your healthcare professional. [place] [address] any warranty or liability for your use of this information. Patient Education        Accidental Overdose of Medicine: Care Instructions  Overview     Almost any medicine can cause harm if you take too much of it. You have had treatment to help your body get rid of an overdose of a medicine. This may have been an over-the-counter medicine. Or it might be one that a doctor prescribed. It may even have been a vitamin or a supplement. During treatment, the doctor may have given you fluids and medicine. You also may have had lab tests. Then the doctor made sure that you were well enough to go home. The doctor has checked you carefully, but problems can develop later. If you notice any problems or new symptoms, get medical treatment right away. Follow-up care is a key part of your treatment and safety. Be sure to make and go to all appointments, and call your doctor if you are having problems. It's also a good idea to know your test results and keep a list of the medicines you take. How can you care for yourself at home? Home care  · Talk with your doctor about what you should eat or drink. · If you normally take medicines, ask your doctor when you can start taking them again. · Read the information that comes with any medicine. If you have questions, ask your doctor or pharmacist.  Prevention  · Be safe with medicines. Take your medicines exactly as prescribed or as the label directs. Call your doctor if you think you are having a problem with your medicine. · Keep your medicines in the containers they came in. Keep them with the original labels. · Find out what to do if you miss a dose of your medicine. When should you call for help? Poison control centers, hospitals, or your doctor can give immediate advice in the case of a poisoning. [facility] [employer] number is [phone]. Have the poison container with you so you can give complete information to the poison control center. This includes what the poison or substance is, when it was taken, and how much was taken. Do not try to make the person vomit. Call 911 anytime you think you may need emergency care.  For example, call if you or someone else:    · Has used or currently uses alcohol or drugs and is very confused or can't stay awake.     · Has passed out (lost consciousness).     · Has severe trouble breathing.     · Is having a seizure. Call your doctor now or seek immediate medical care if:    · You are vomiting.     · You have a new or worse headache.     · You are dizzy or lightheaded or feel like you may faint. Watch closely for changes in your health, and be sure to contact your doctor if:    · You do not get better as expected. Where can you learn more? Go to http://www.gray.com/  Enter C165 in the search box to learn more about \"Accidental Overdose of Medicine: Care Instructions. \"  Current as of: February 11, 2021               Content Version: 13.2  © 2006-2022 [employer]. Care instructions adapted under license by [employer] (which disclaims liability or warranty for this information). If you have questions about a medical condition or this instruction, always ask your healthcare professional. [address] any warranty or liability for your use of this information.

## 2022-04-05 NOTE — PROGRESS NOTES
4/5/2022 9:02 AM Noted pt does not meet requirement for inpatient psych. Pt to follow up with her already established outpatient therapist.  No CM needs identified, pt's family will transport pt home.   TELLO Gomez  Care Management Interventions  MyChart Signup: No  Discharge Durable Medical Equipment: No  Physical Therapy Consult: No  Occupational Therapy Consult: No  Speech Therapy Consult: No  Support Systems: Spouse/Significant Other,Parent(s)  Discharge Location  Patient Expects to be Discharged to[de-identified] Home with family assistance

## 2022-04-05 NOTE — PROGRESS NOTES
Pt alert and oriented. Vital signs stable. Teledoc meeting with psychiatry completed. Provider stated in note 1:1 SI precautions can be discontinued. Pt states she feels better and is ready to be discharged home. No thoughts of harming herself. Maintained safety protocols throughout the shift. Will continue to monitor.

## 2022-04-08 ENCOUNTER — TELEPHONE (OUTPATIENT)
Dept: INTERNAL MEDICINE CLINIC | Age: 32
End: 2022-04-08

## 2022-04-08 NOTE — TELEPHONE ENCOUNTER
----- Message from Yamini Ramos sent at 4/8/2022 10:50 AM EDT -----  Subject: Message to Provider    QUESTIONS  Information for Provider? Pt needs [facility] but I failed to   provide details in my previous message. Pt was admitted to [address] [place] [address]   4/3-4/5 due to being non-responsive after taking sleep medication. Please   call to schedule f/u w/in 7-10 days or approval to be seen beyond that   time frame. No VM, will set up my chart & can communicate through there.  ---------------------------------------------------------------------------  --------------  [facility]  What is the best way for the office to contact you? Do not leave any   message, patient will call back for answer  Preferred Call Back Phone Number? [phone]  ---------------------------------------------------------------------------  --------------  SCRIPT ANSWERS  Relationship to Patient? Third Party  Third Party Type? Other  Other Third Party Type? [facility]  Representative Name?  Ambika

## 2022-04-08 NOTE — TELEPHONE ENCOUNTER
----- Message from Geovannapranav Park sent at 4/8/2022 10:41 AM EDT -----  Subject: Appointment Request    Reason for Call: Routine Hospital Follow Up    QUESTIONS  Type of Appointment? Established Patient  Reason for appointment request? Available appointments did not meet   patient need  Additional Information for Provider? Pt sees Dr. Freda Manuel & needs   [facility] f/u but no availability until 4/22. Pt is ok to wait but needs   Dr. Freda Manuel needs to approve to wait that long. Please call to   schedule Pt will set up mychart & staff can message her there.  ---------------------------------------------------------------------------  --------------  [address]  What is the best way for the office to contact you? Do not leave any   message, patient will call back for answer  Preferred Call Back Phone Number? [phone]  ---------------------------------------------------------------------------  --------------  SCRIPT ANSWERS  Relationship to Patient? Self  (Patient requests to see provider urgently. )? No  (Has the patient been discharged from the hospital within 2 business days   AND does not have a Telephone Encounter  Follow Up From [address] [place] [address]   documented in [address]?)? No  Do you have any questions for your primary care provider that need to be   answered prior to your appointment? (Use RN Triage if question pertains to   anything on the red flag list)? No  (Patient needs follow up visit after hospital discharge) Book first   available appointment within 7 days OF DISCHARGE, if no appt, proceed to   book the next available time slot within 14 days OF DISCHARGE AND Send   Message to Provider. [address] [facility] Follow Up appointment cannot be booked   beyond 14 Days and should result in a Message to Provider. ? Yes   Have you been diagnosed with, awaiting test results for, or told that you   are suspected of having COVID-19 (Coronavirus)?  (If patient has tested   negative or was tested as a requirement for work, school, or travel and   not based on symptoms, answer no)? No  Within the past 10 days have you developed any of the following symptoms   (answer no if symptoms have been present longer than 10 days or began   more than 10 days ago)? Fever or Chills, Cough, Shortness of breath or   difficulty breathing, Loss of taste or smell, Sore throat, Nasal   congestion, Sneezing or runny nose, Fatigue or generalized body aches   (answer no if pain is specific to a body part e.g. back pain), Diarrhea,   Headache? No  Have you had close contact with someone with COVID-19 in the last 7 days? No  (Service Expert  click yes below to proceed with [employer] As Usual   Scheduling)?  Yes